# Patient Record
Sex: FEMALE | Race: WHITE | Employment: UNEMPLOYED | URBAN - METROPOLITAN AREA
[De-identification: names, ages, dates, MRNs, and addresses within clinical notes are randomized per-mention and may not be internally consistent; named-entity substitution may affect disease eponyms.]

---

## 2017-02-07 ENCOUNTER — OFFICE VISIT (OUTPATIENT)
Dept: FAMILY MEDICINE CLINIC | Age: 27
End: 2017-02-07

## 2017-02-07 VITALS
BODY MASS INDEX: 27.29 KG/M2 | HEIGHT: 63 IN | SYSTOLIC BLOOD PRESSURE: 137 MMHG | WEIGHT: 154 LBS | TEMPERATURE: 98.2 F | DIASTOLIC BLOOD PRESSURE: 89 MMHG | HEART RATE: 115 BPM | RESPIRATION RATE: 20 BRPM | OXYGEN SATURATION: 97 %

## 2017-02-07 DIAGNOSIS — R00.2 PALPITATIONS: Primary | ICD-10-CM

## 2017-02-07 NOTE — MR AVS SNAPSHOT
Visit Information Date & Time Provider Department Dept. Phone Encounter #  
 2/7/2017  3:00 PM Miguel Angel Schaeffer MD [address] [facility] [phone] [id number] Follow-up Instructions Return if symptoms worsen or fail to improve. Upcoming Health Maintenance Date Due  
 HPV AGE 9Y-34Y (1 of 3 - Female 3 Dose Series) 10/8/2002 Pneumococcal 19-64 Medium Risk (1 of 1 - PPSV23) 10/8/2010 DTaP/Tdap/Td series (1 - Tdap) 10/8/2012 INFLUENZA AGE 9 TO ADULT 8/1/2016 PAP AKA CERVICAL CYTOLOGY 9/8/2017 Allergies as of 2/7/2017  Review Complete On: 2/7/2017 By: Miguel Angel Schaeffer MD  
 No Known Allergies Current Immunizations  Reviewed on 8/17/2015 No immunizations on file. Not reviewed this visit Vitals BP Pulse Temp Resp Height(growth percentile) Weight(growth percentile) 137/89 (!) 115 98.2 °F (36.8 °C) 20 5' 3\" (1.6 m) 154 lb (69.9 kg) SpO2 BMI OB Status Smoking Status 97% 27.28 kg/m2 Having regular periods Current Every Day Smoker Vitals History BMI and BSA Data Body Mass Index Body Surface Area  
 27.28 kg/m 2 1.76 m 2 Preferred Pharmacy Pharmacy Name Phone [id number], [address] [facility] [phone] Your Updated Medication List  
  
   
This list is accurate as of: 2/7/17  4:13 PM.  Always use your most recent med list.  
  
  
  
  
 FLUoxetine 20 mg capsule Commonly known as:  PROzac LORazepam 0.5 mg tablet Commonly known as:  ATIVAN Take 1 Tab by mouth every eight (8) hours as needed for Anxiety. Max Daily Amount: 1.5 mg. Use sparingly and for extreme anxiety. Must last 30 days. norgestimate-ethinyl estradiol 0.25-35 mg-mcg Tab Commonly known as:  Allegra Bossier Take 1 Tab by mouth daily. Indications: DYSMENORRHEA  
  
 propranolol 10 mg tablet Commonly known as:  INDERAL Take 1 Tab by mouth three (3) times daily as needed. simvastatin 40 mg tablet Commonly known as:  ZOCOR Take 1 Tab by mouth nightly. SUMAtriptan 100 mg tablet Commonly known as:  IMITREX Take 1 Tab by mouth once as needed for Migraine for up to 1 dose. Repeat 2 hours if not gone, no more than 2 pills in 24 hours  
  
 varenicline 0.5 mg (11)- 1 mg (42) Dspk Commonly known as:  CHANTIX STARTER BARRETT Use as directed Follow-up Instructions Return if symptoms worsen or fail to improve. Introducing [facility] & HEALTH SERVICES! Dear Vasiliy Colon: 
Thank you for requesting a [employer]ular[employer] account. Our records indicate that you already have an active Singularu account. You can access your account anytime at [email or web address] Did you know that you can access your hospital and ER discharge instructions at any time in Singularu? You can also review all of your test results from your hospital stay or ER visit. Additional Information If you have questions, please visit the Frequently Asked Questions section of the Singularu website at [email or web address] Remember, Singularu is NOT to be used for urgent needs. For medical emergencies, dial 911. Now available from your iPhone and Android! Please provide this summary of care documentation to your next provider. Your primary care clinician is listed as WENCESLAO LAWSON. If you have any questions after today's visit, please call [phone].

## 2017-02-07 NOTE — PROGRESS NOTES
Patient here for headache, elevated bp, elevated heart rate . Not taking medications as ordered. \"afraid of them making me feel worse\"     1. Have you been to the ER, urgent care clinic since your last visit? Hospitalized since your last visit? No    2. Have you seen or consulted any other health care providers outside of the [address] [place] [address] since your last visit? Include any pap smears or colon screening. No         Chief Complaint   Patient presents with    Headache     bp elevated    Anxiety     \" feels like a panic attack that is not stopping, started about 3 weeks ago\"      she is a 23y.o. year old female who presents for evalution. Reviewed PmHx, RxHx, FmHx, SocHx, AllgHx and updated and dated in the chart. Patient Active Problem List    Diagnosis    Smoker    Upper abdominal pain    Migraine headache    ADD (attention deficit disorder)    Panic anxiety syndrome    Depression    Elevated LFTs    Seasonal allergic reaction    Hypercholesteremia       Review of Systems - negative except as listed above in the HPI    Objective:     Vitals:    02/07/17 1548   BP: 137/89   Pulse: (!) 115   Resp: 20   Temp: 98.2 °F (36.8 °C)   SpO2: 97%   Weight: 154 lb (69.9 kg)   Height: 5' 3\" (1.6 m)     Physical Examination: General appearance - alert, well appearing, and in no distress  Chest - clear to auscultation, no wheezes, rales or rhonchi, symmetric air entry  Heart - normal rate, regular rhythm, normal S1, S2, no murmurs, rubs, clicks or gallops    Assessment/ Plan:   Vasiliy Colon was seen today for headache and anxiety. Diagnoses and all orders for this visit:    Palpitations   -dwp to take rx given    Follow-up Disposition:  Return if symptoms worsen or fail to improve. I have discussed the diagnosis with the patient and the intended plan as seen in the above orders. The patient understands and agrees with the plan.  The patient has received an after-visit summary and questions were answered concerning future plans. Medication Side Effects and Warnings were discussed with patient  Patient Labs were reviewed and or requested:  Patient Past Records were reviewed and or requested    Apple Rivera M.D. There are no Patient Instructions on file for this visit.

## 2017-02-27 DIAGNOSIS — H69.81 EUSTACHIAN TUBE DYSFUNCTION, RIGHT: ICD-10-CM

## 2017-02-27 DIAGNOSIS — J30.89 ALLERGIC RHINITIS DUE TO OTHER ALLERGEN: ICD-10-CM

## 2017-02-27 RX ORDER — CETIRIZINE HCL 10 MG
10 TABLET ORAL DAILY
Qty: 30 TAB | Refills: 5 | Status: SHIPPED | OUTPATIENT
Start: 2017-02-27 | End: 2017-09-05 | Stop reason: SDUPTHER

## 2017-02-28 ENCOUNTER — TELEPHONE (OUTPATIENT)
Dept: FAMILY MEDICINE CLINIC | Age: 27
End: 2017-02-28

## 2017-02-28 ENCOUNTER — OFFICE VISIT (OUTPATIENT)
Dept: FAMILY MEDICINE CLINIC | Age: 27
End: 2017-02-28

## 2017-02-28 VITALS
HEART RATE: 73 BPM | DIASTOLIC BLOOD PRESSURE: 85 MMHG | TEMPERATURE: 97.9 F | WEIGHT: 155.4 LBS | SYSTOLIC BLOOD PRESSURE: 129 MMHG | OXYGEN SATURATION: 98 % | RESPIRATION RATE: 20 BRPM | HEIGHT: 63 IN | BODY MASS INDEX: 27.54 KG/M2

## 2017-02-28 DIAGNOSIS — R05.9 COUGH: ICD-10-CM

## 2017-02-28 DIAGNOSIS — J02.9 SORE THROAT: ICD-10-CM

## 2017-02-28 DIAGNOSIS — J06.9 ACUTE URI: Primary | ICD-10-CM

## 2017-02-28 DIAGNOSIS — B36.0 TINEA VERSICOLOR: ICD-10-CM

## 2017-02-28 DIAGNOSIS — H69.82 ETD (EUSTACHIAN TUBE DYSFUNCTION), LEFT: ICD-10-CM

## 2017-02-28 LAB
S PYO AG THROAT QL: NEGATIVE
VALID INTERNAL CONTROL?: YES

## 2017-02-28 RX ORDER — HYDROCODONE POLISTIREX AND CHLORPHENIRAMINE POLISTIREX 10; 8 MG/5ML; MG/5ML
5 SUSPENSION, EXTENDED RELEASE ORAL
Qty: 115 ML | Refills: 0 | Status: SHIPPED | OUTPATIENT
Start: 2017-02-28 | End: 2017-03-15 | Stop reason: ALTCHOICE

## 2017-02-28 RX ORDER — CODEINE PHOSPHATE AND GUAIFENESIN 10; 100 MG/5ML; MG/5ML
5 SOLUTION ORAL
Qty: 180 ML | Refills: 0 | Status: SHIPPED | OUTPATIENT
Start: 2017-02-28 | End: 2017-02-28

## 2017-02-28 RX ORDER — MOMETASONE FUROATE 50 UG/1
2 SPRAY, METERED NASAL DAILY
Qty: 1 CONTAINER | Refills: 2 | Status: ON HOLD | OUTPATIENT
Start: 2017-02-28 | End: 2018-10-15

## 2017-02-28 RX ORDER — AZITHROMYCIN 250 MG/1
TABLET, FILM COATED ORAL
Qty: 6 TAB | Refills: 0 | Status: SHIPPED | OUTPATIENT
Start: 2017-02-28 | End: 2017-03-05

## 2017-02-28 NOTE — PROGRESS NOTES
Chief Complaint   Patient presents with    Sore Throat    Cold Symptoms     productive cough, stuffy nose    Skin Problem     poss. vitiligo     Patient has not started Zyrtec yet. Pt reports having a productive cough with clear fleam.    Patient reports taking Theraflu for two days. Problem started \"yesterday\"  Reports Dizziness spells during postural changes and throughout the day  1. Have you been to the ER, urgent care clinic since your last visit? Hospitalized since your last visit? No    2. Have you seen or consulted any other health care providers outside of the [facility] since your last visit? Include any pap smears or colon screening.  No

## 2017-02-28 NOTE — TELEPHONE ENCOUNTER
Pt called. Med is sold out at every pharm for the cough syrup. She wants to  a new script for a different cough syrup. Pt is aware that old script needs to be returned to office when new script is ready.
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
## 2017-02-28 NOTE — MR AVS SNAPSHOT
Visit Information Date & Time Provider Department Dept. Phone Encounter #  
 2/28/2017 11:15 AM Johana Bamberger, NP [address] [phone] [id number] Follow-up Instructions Return if symptoms worsen or fail to improve. Upcoming Health Maintenance Date Due  
 HPV AGE 9Y-34Y (1 of 3 - Female 3 Dose Series) 10/8/2002 Pneumococcal 19-64 Medium Risk (1 of 1 - PPSV23) 10/8/2010 DTaP/Tdap/Td series (1 - Tdap) 10/8/2012 PAP AKA CERVICAL CYTOLOGY 9/8/2017 Allergies as of 2/28/2017  Review Complete On: 2/28/2017 By: Johana Bamberger, NP Severity Noted Reaction Type Reactions Tessalon [Benzonatate]  02/28/2017    Rash Current Immunizations  Reviewed on 8/17/2015 No immunizations on file. Not reviewed this visit You Were Diagnosed With   
  
 Codes Comments Acute URI    -  Primary ICD-10-CM: J06.9 ICD-9-CM: 465.9 Sore throat     ICD-10-CM: J02.9 ICD-9-CM: 615 Tinea versicolor     ICD-10-CM: B36.0 ICD-9-CM: 111.0 ETD (eustachian tube dysfunction), left     ICD-10-CM: U23.80 ICD-9-CM: 381.81 Cough     ICD-10-CM: R05 ICD-9-CM: 326. 2 Vitals BP  
  
  
  
  
  
 129/85 (BP 1 Location: Right arm, BP Patient Position: Sitting) Vitals History BMI and BSA Data Body Mass Index Body Surface Area  
 27.53 kg/m 2 1.77 m 2 Preferred Pharmacy Pharmacy Name Phone [id number], [address] [facility] [phone] Your Updated Medication List  
  
   
This list is accurate as of: 2/28/17 12:04 PM.  Always use your most recent med list.  
  
  
  
  
 azithromycin 250 mg tablet Commonly known as:  [place] Abu Take 2 tablets today, then take 1 tablet daily  
  
 cetirizine 10 mg tablet Commonly known as:  ZYRTEC Take 1 Tab by mouth daily. FLUoxetine 20 mg capsule Commonly known as:  PROzac  
as needed. guaiFENesin-codeine 100-10 mg/5 mL solution Commonly known as:  guaiFENesin AC Take 5 mL by mouth three (3) times daily as needed for Cough. Max Daily Amount: 15 mL. LORazepam 0.5 mg tablet Commonly known as:  ATIVAN Take 1 Tab by mouth every eight (8) hours as needed for Anxiety. Max Daily Amount: 1.5 mg. Use sparingly and for extreme anxiety. Must last 30 days. mometasone 50 mcg/actuation nasal spray Commonly known as:  NASONEX  
2 Sprays by Both Nostrils route daily. norgestimate-ethinyl estradiol 0.25-35 mg-mcg Tab Commonly known as:  Sana Boards Take 1 Tab by mouth daily. Indications: DYSMENORRHEA  
  
 propranolol 10 mg tablet Commonly known as:  INDERAL Take 1 Tab by mouth three (3) times daily as needed. simvastatin 40 mg tablet Commonly known as:  ZOCOR Take 1 Tab by mouth nightly. SUMAtriptan 100 mg tablet Commonly known as:  IMITREX Take 1 Tab by mouth once as needed for Migraine for up to 1 dose. Repeat 2 hours if not gone, no more than 2 pills in 24 hours Prescriptions Printed Refills  
 guaiFENesin-codeine (GUAIFENESIN AC) 100-10 mg/5 mL solution 0 Sig: Take 5 mL by mouth three (3) times daily as needed for Cough. Max Daily Amount: 15 mL. Class: Print Route: Oral  
  
Prescriptions Sent to Pharmacy Refills  
 mometasone (NASONEX) 50 mcg/actuation nasal spray 2 Si Sprays by Both Nostrils route daily. Class: Normal  
 Pharmacy: [facility] Ph #: [phone] Route: Both Nostrils  
 azithromycin (ZITHROMAX) 250 mg tablet 0 Sig: Take 2 tablets today, then take 1 tablet daily Class: Normal  
 Pharmacy: [facility] Ph #: [phone] We Performed the Following AMB POC RAPID STREP A [76518 CPT(R)] Follow-up Instructions Return if symptoms worsen or fail to improve. Patient Instructions Tinea Versicolor: Care Instructions Your Care Instructions Tinea versicolor is a skin infection caused by a yeast (fungus). It causes many small spots, usually on the chest and back. The spotted skin can be flaky or scaly. The spots do not tan in the sun, so they are lighter than the skin around them. Some spots may be darker than the skin around them. The yeast that causes tinea versicolor normally lives on your skin. But it becomes a problem only when warmth and humidity allow the yeast to grow rapidly and increase in number. Some people are more likely to get tinea versicolor. It does not spread from person to person. Tinea versicolor usually gets better as you age. You can treat tinea versicolor with cream or ointment that kills the yeast. You may need pills to kill the fungus if the spots cover a lot of your body. Although treatment kills the yeast quickly, your skin may not return to normal for months after treatment. You can get this condition again after treatment. Follow-up care is a key part of your treatment and safety. Be sure to make and go to all appointments, and call your doctor if you are having problems. It's also a good idea to know your test results and keep a list of the medicines you take. How can you care for yourself at home? · Follow the directions for use of creams, shampoos, or solutions. You will probably need to use them for 1 to 2 weeks. If your skin gets irritated, stop using the product, and call your doctor. · To prevent tinea versicolor, use a cream, shampoo, or solution one time a month. Your doctor may prescribe pills to prevent the spots from returning. · Dry off well after bathing. Keep your skin clean and dry. · Always wear sunscreen on exposed skin. Make sure to use a broad-spectrum sunscreen that has a sun protection factor (SPF) of 30 or higher. Use it every day, even when it is cloudy. · If you keep getting tinea versicolor, wash your clothes in very hot water to kill the yeast. 
When should you call for help? Call your doctor now or seek immediate medical care if: 
· Your skin is badly broken from scratching. · You have signs of infection such as: 
¨ Pain, warmth, or swelling in your skin. ¨ Red streaks near a wound in your skin. ¨ Pus coming from a wound in your skin. ¨ A fever. Watch closely for changes in your health, and be sure to contact your doctor if: 
· Your home treatment does not help. · Your skin is irritated or red. Where can you learn more? Go to http://emily-heather.info/. Enter K528 in the search box to learn more about \"Tinea Versicolor: Care Instructions. \" Current as of: March 23, 2016 Content Version: 11.1 © 5114-4300 [employer]. Care instructions adapted under license by [employer] (which disclaims liability or warranty for this information). If you have questions about a medical condition or this instruction, always ask your healthcare professional. [address] any warranty or liability for your use of this information. Introducing \[facility]\"" & HEALTH SERVICES! Dear Shagufta Wade: 
Thank you for requesting a [employer] account. Our records indicate that you already have an active [employer] account. You can access your account anytime at [email or web address] Did you know that you can access your hospital and ER discharge instructions at any time in [employer]? You can also review all of your test results from your hospital stay or ER visit. Additional Information If you have questions, please visit the Frequently Asked Questions section of the [employer] website at [email or web address] Remember, [employer] is NOT to be used for urgent needs. For medical emergencies, dial 911. Now available from your iPhone and Android! Please provide this summary of care documentation to your next provider. Your primary care clinician is listed as WENCESLAO LAWSON. If you have any questions after today's visit, please call [phone].

## 2017-02-28 NOTE — PATIENT INSTRUCTIONS
Tinea Versicolor: Care Instructions  Your Care Instructions  Tinea versicolor is a skin infection caused by a yeast (fungus). It causes many small spots, usually on the chest and back. The spotted skin can be flaky or scaly. The spots do not tan in the sun, so they are lighter than the skin around them. Some spots may be darker than the skin around them. The yeast that causes tinea versicolor normally lives on your skin. But it becomes a problem only when warmth and humidity allow the yeast to grow rapidly and increase in number. Some people are more likely to get tinea versicolor. It does not spread from person to person. Tinea versicolor usually gets better as you age. You can treat tinea versicolor with cream or ointment that kills the yeast. You may need pills to kill the fungus if the spots cover a lot of your body. Although treatment kills the yeast quickly, your skin may not return to normal for months after treatment. You can get this condition again after treatment. Follow-up care is a key part of your treatment and safety. Be sure to make and go to all appointments, and call your doctor if you are having problems. It's also a good idea to know your test results and keep a list of the medicines you take. How can you care for yourself at home? · Follow the directions for use of creams, shampoos, or solutions. You will probably need to use them for 1 to 2 weeks. If your skin gets irritated, stop using the product, and call your doctor. · To prevent tinea versicolor, use a cream, shampoo, or solution one time a month. Your doctor may prescribe pills to prevent the spots from returning. · Dry off well after bathing. Keep your skin clean and dry. · Always wear sunscreen on exposed skin. Make sure to use a broad-spectrum sunscreen that has a sun protection factor (SPF) of 30 or higher. Use it every day, even when it is cloudy.   · If you keep getting tinea versicolor, wash your clothes in very hot water to kill the yeast.  When should you call for help? Call your doctor now or seek immediate medical care if:  · Your skin is badly broken from scratching. · You have signs of infection such as:  ¨ Pain, warmth, or swelling in your skin. ¨ Red streaks near a wound in your skin. ¨ Pus coming from a wound in your skin. ¨ A fever. Watch closely for changes in your health, and be sure to contact your doctor if:  · Your home treatment does not help. · Your skin is irritated or red. Where can you learn more? Go to http://emily-heather.info/. Enter D714 in the search box to learn more about \"Tinea Versicolor: Care Instructions. \"  Current as of: March 23, 2016  Content Version: 11.1  © 0699-0128 [employer]. Care instructions adapted under license by [employer] (which disclaims liability or warranty for this information). If you have questions about a medical condition or this instruction, always ask your healthcare professional. [place] [address] any warranty or liability for your use of this information.

## 2017-03-15 ENCOUNTER — OFFICE VISIT (OUTPATIENT)
Dept: FAMILY MEDICINE CLINIC | Age: 27
End: 2017-03-15

## 2017-03-15 VITALS
HEIGHT: 63 IN | WEIGHT: 160 LBS | BODY MASS INDEX: 28.35 KG/M2 | HEART RATE: 73 BPM | DIASTOLIC BLOOD PRESSURE: 81 MMHG | TEMPERATURE: 97.9 F | RESPIRATION RATE: 20 BRPM | SYSTOLIC BLOOD PRESSURE: 131 MMHG | OXYGEN SATURATION: 98 %

## 2017-03-15 DIAGNOSIS — R42 VERTIGO: ICD-10-CM

## 2017-03-15 DIAGNOSIS — E78.00 HYPERCHOLESTEREMIA: Primary | ICD-10-CM

## 2017-03-15 RX ORDER — CETIRIZINE HCL 10 MG
10 TABLET ORAL DAILY
Qty: 30 TAB | Refills: 5 | Status: SHIPPED | OUTPATIENT
Start: 2017-03-15 | End: 2017-03-16 | Stop reason: SDUPTHER

## 2017-03-15 NOTE — PROGRESS NOTES
Patient here for follow up htn, fasting labs    1. Have you been to the ER, urgent care clinic since your last visit? Hospitalized since your last visit? No    2. Have you seen or consulted any other health care providers outside of the [address] [place] [address] since your last visit? Include any pap smears or colon screening. No     Chief Complaint   Patient presents with    Follow-up     htn    Labs     fasting     she is a 23y.o. year old female who presents for evalution. Reviewed PmHx, RxHx, FmHx, SocHx, AllgHx and updated and dated in the chart. Patient Active Problem List    Diagnosis    Smoker    Upper abdominal pain    Migraine headache    ADD (attention deficit disorder)    Panic anxiety syndrome    Depression    Elevated LFTs    Seasonal allergic reaction    Hypercholesteremia       Review of Systems - negative except as listed above in the HPI    Objective:     Vitals:    03/15/17 1015   BP: 131/81   Pulse: 73   Resp: 20   Temp: 97.9 °F (36.6 °C)   SpO2: 98%   Weight: 160 lb (72.6 kg)   Height: 5' 3\" (1.6 m)     Physical Examination: General appearance - alert, well appearing, and in no distress  Eyes - pupils equal and reactive, extraocular eye movements intact  Ears - bilateral TM's and external ear canals normal  Nose - normal and patent, no erythema, discharge or polyps  Mouth - mucous membranes moist, pharynx normal without lesions  Neck - supple, no significant adenopathy  Chest - clear to auscultation, no wheezes, rales or rhonchi, symmetric air entry  Heart - normal rate, regular rhythm, normal S1, S2, no murmurs, rubs, clicks or gallops    Assessment/ Plan:   Misael Diaz was seen today for follow-up and labs. Diagnoses and all orders for this visit:    Hypercholesteremia  -     LIPID PANEL  -     METABOLIC PANEL, COMPREHENSIVE  -cassidy rx    Vertigo  -     cetirizine (ZYRTEC) 10 mg tablet;  Take 1 Tab by mouth daily.  -add rx       Follow-up Disposition:  Return in about 6 months (around 9/15/2017). I have discussed the diagnosis with the patient and the intended plan as seen in the above orders. The patient understands and agrees with the plan. The patient has received an after-visit summary and questions were answered concerning future plans. Medication Side Effects and Warnings were discussed with patient  Patient Labs were reviewed and or requested:  Patient Past Records were reviewed and or requested    Aneesh Jett M.D. There are no Patient Instructions on file for this visit.

## 2017-03-16 ENCOUNTER — OFFICE VISIT (OUTPATIENT)
Dept: MIDWIFE SERVICES | Age: 27
End: 2017-03-16

## 2017-03-16 VITALS
HEIGHT: 63 IN | HEART RATE: 98 BPM | WEIGHT: 157 LBS | BODY MASS INDEX: 27.82 KG/M2 | SYSTOLIC BLOOD PRESSURE: 140 MMHG | DIASTOLIC BLOOD PRESSURE: 87 MMHG

## 2017-03-16 DIAGNOSIS — Z01.419 ENCOUNTER FOR WELL WOMAN EXAM WITH ROUTINE GYNECOLOGICAL EXAM: Primary | ICD-10-CM

## 2017-03-16 LAB
ALBUMIN SERPL-MCNC: 4.4 G/DL (ref 3.5–5.5)
ALBUMIN/GLOB SERPL: 1.5 {RATIO} (ref 1.2–2.2)
ALP SERPL-CCNC: 79 IU/L (ref 39–117)
ALT SERPL-CCNC: 7 IU/L (ref 0–32)
AST SERPL-CCNC: 8 IU/L (ref 0–40)
BILIRUB SERPL-MCNC: 0.3 MG/DL (ref 0–1.2)
BUN SERPL-MCNC: 10 MG/DL (ref 6–20)
BUN/CREAT SERPL: 14 (ref 8–20)
CALCIUM SERPL-MCNC: 9.4 MG/DL (ref 8.7–10.2)
CHLORIDE SERPL-SCNC: 101 MMOL/L (ref 96–106)
CHOLEST SERPL-MCNC: 139 MG/DL (ref 100–199)
CO2 SERPL-SCNC: 22 MMOL/L (ref 18–29)
CREAT SERPL-MCNC: 0.72 MG/DL (ref 0.57–1)
GLOBULIN SER CALC-MCNC: 3 G/DL (ref 1.5–4.5)
GLUCOSE SERPL-MCNC: 78 MG/DL (ref 65–99)
HDLC SERPL-MCNC: 32 MG/DL
INTERPRETATION, 910389: NORMAL
LDLC SERPL CALC-MCNC: 39 MG/DL (ref 0–99)
POTASSIUM SERPL-SCNC: 4.5 MMOL/L (ref 3.5–5.2)
PROT SERPL-MCNC: 7.4 G/DL (ref 6–8.5)
SODIUM SERPL-SCNC: 140 MMOL/L (ref 134–144)
TRIGL SERPL-MCNC: 341 MG/DL (ref 0–149)
VLDLC SERPL CALC-MCNC: 68 MG/DL (ref 5–40)

## 2017-03-16 NOTE — PROGRESS NOTES
Chief Complaint   Patient presents with    Well Woman     Visit Vitals    /87    Pulse 98    Ht 5' 3\" (1.6 m)    Wt 157 lb (71.2 kg)    Breastfeeding No    BMI 27.81 kg/m2     1. Have you been to the ER, urgent care clinic since your last visit? Hospitalized since your last visit? No    2. Have you seen or consulted any other health care providers outside of the [address] [place] [address] since your last visit? Include any pap smears or colon screening. No     Here today for well woman. She is due for a pap smear.

## 2017-03-16 NOTE — PROGRESS NOTES
Well Woman Check Up       Subjective:     23 y.o. CaucasianF   LMP: 2017 for routine annual exam    Social History: single partner, contraception - OCP (Oral Contraceptive Pills). Pertinent past medical history: . PAP History:     Neg pap  Preventive Measures. Colonoscopy:  Age 48 or earlier based on signigicant FH  Mammogram:  Age 48 or earlier based on FH  DEXA: Age 72 or sooner based on risk factors  Lipids:  Age 36 or based on FH/risk factors      Patient Active Problem List    Diagnosis Date Noted    Smoker 2015    Upper abdominal pain 2015    Migraine headache 10/03/2014    ADD (attention deficit disorder) 2010    Panic anxiety syndrome 2010    Depression 2010    Elevated LFTs 2010    Seasonal allergic reaction 2010    Hypercholesteremia 2010     Current Outpatient Prescriptions   Medication Sig Dispense Refill    mometasone (NASONEX) 50 mcg/actuation nasal spray 2 Sprays by Both Nostrils route daily. 1 Container 2    cetirizine (ZYRTEC) 10 mg tablet Take 1 Tab by mouth daily. 30 Tab 5    propranolol (INDERAL) 10 mg tablet Take 1 Tab by mouth three (3) times daily as needed. 60 Tab 0    SUMAtriptan (IMITREX) 100 mg tablet Take 1 Tab by mouth once as needed for Migraine for up to 1 dose. Repeat 2 hours if not gone, no more than 2 pills in 24 hours 12 Tab 5    LORazepam (ATIVAN) 0.5 mg tablet Take 1 Tab by mouth every eight (8) hours as needed for Anxiety. Max Daily Amount: 1.5 mg. Use sparingly and for extreme anxiety. Must last 30 days. 15 Tab 0    norgestimate-ethinyl estradiol (ORTHO-CYCLEN) 0.25-35 mg-mcg tab Take 1 Tab by mouth daily. Indications: DYSMENORRHEA 28 Tab 2    simvastatin (ZOCOR) 40 mg tablet Take 1 Tab by mouth nightly. 30 Tab 1    FLUoxetine (PROZAC) 20 mg capsule 20 mg daily. 5     Allergies   Allergen Reactions    Tessalon [Benzonatate] Rash     History reviewed.  No pertinent family history. Social History   Substance Use Topics    Smoking status: Current Every Day Smoker     Packs/day: 0.50     Years: 6.00     Types: Cigarettes    Smokeless tobacco: Never Used    Alcohol use No        ROS:  Feeling well. No dyspnea or chest pain on exertion. No abdominal pain, change in bowel habits, black or bloody stools. No urinary tract symptoms. GYN ROS: normal menses, no abnormal bleeding, pelvic pain or discharge, no breast pain or new or enlarging lumps on self exam. No neurological complaints. Objective:     Visit Vitals    /87    Pulse 98    Ht 5' 3\" (1.6 m)    Wt 157 lb (71.2 kg)    LMP 02/20/2017    Breastfeeding No    BMI 27.81 kg/m2     Gen: The patient appears well, alert, oriented x 3, in no distress. ENT:  normal.   Neck: supple. No adenopathy or thyromegaly. DOUGLAS. Lungs:  clear, good air entry, no wheezes, rhonchi or rales. CV: S1 and S2 normal, no murmurs, regular rate and rhythm. Abdomen: soft without tenderness, guarding, mass or organomegaly. Extremities:  no edema, normal peripheral pulses. Neurological:normal, no focal findings. BREAST EXAM: Examined upright and supine. Benign symmetrical breasts with everted nipples. No masses, no nodes. No nipple retraction or discharge. No skin retraction or subclavicular or axillary lymphadenopathy. PELVIC EXAM: VULVA: normal appearing vulva with no masses, tenderness or lesions, VAGINA: normal appearing vagina with normal color and discharge, no lesions, CERVIX: normal appearing cervix without discharge or lesions, UTERUS: uterus is normal size, shape, consistency and nontender, ADNEXA: normal adnexa in size, nontender and no masses, PAP: Pap smear done today, thin-prep method, HPV test exam chaperoned by:  Kelly Canales RN      Assessment/Plan:     Domenico Bran was seen today for well woman.     Diagnoses and all orders for this visit:    Encounter for well woman exam with routine gynecological exam      Follow-up Disposition:  Return in about 1 year (around 3/16/2018) for Annual exam..            Chava Lucas.  Checo Boles MD, [address] [place] [address], Retired    Caryle Cowman Professor, [address]

## 2017-03-17 ENCOUNTER — OFFICE VISIT (OUTPATIENT)
Dept: FAMILY MEDICINE CLINIC | Age: 27
End: 2017-03-17

## 2017-03-17 VITALS
BODY MASS INDEX: 27.96 KG/M2 | TEMPERATURE: 98.2 F | RESPIRATION RATE: 18 BRPM | OXYGEN SATURATION: 98 % | WEIGHT: 157.8 LBS | HEIGHT: 63 IN | HEART RATE: 96 BPM | DIASTOLIC BLOOD PRESSURE: 70 MMHG | SYSTOLIC BLOOD PRESSURE: 140 MMHG

## 2017-03-17 DIAGNOSIS — G44.219 EPISODIC TENSION-TYPE HEADACHE, NOT INTRACTABLE: Primary | ICD-10-CM

## 2017-03-17 RX ORDER — IBUPROFEN 600 MG/1
600 TABLET ORAL
Qty: 30 TAB | Refills: 0 | Status: SHIPPED | OUTPATIENT
Start: 2017-03-17 | End: 2017-08-08 | Stop reason: SDUPTHER

## 2017-03-17 NOTE — PROGRESS NOTES
Zeynep Nagel is a 23 y.o. female   Chief Complaint   Patient presents with    Head Pain    pt states she has had a HA since around 4am, also was recently seen by Dr Keena Pinon and was advised to start allergy meds which she states she took yesterday, advised to take daily. Pt states MATTHEWS is a 6/10 currently. Has not taken her imitrex afraid it might interact with the propranolol and advised they are fine together. she is a 23y.o. year old female who presents for evalution. Reviewed PmHx, RxHx, FmHx, SocHx, AllgHx and updated and dated in the chart. Review of Systems - negative except as listed above in the HPI    Objective:     Vitals:    03/17/17 1336   BP: 140/70   Pulse: 96   Resp: 18   Temp: 98.2 °F (36.8 °C)   TempSrc: Oral   SpO2: 98%   Weight: 157 lb 12.8 oz (71.6 kg)   Height: 5' 3\" (1.6 m)       Current Outpatient Prescriptions   Medication Sig    ibuprofen (MOTRIN) 600 mg tablet Take 1 Tab by mouth every eight (8) hours as needed for Pain.  propranolol (INDERAL) 10 mg tablet Take 1 Tab by mouth three (3) times daily as needed.  norgestimate-ethinyl estradiol (ORTHO-CYCLEN) 0.25-35 mg-mcg tab Take 1 Tab by mouth daily. Indications: DYSMENORRHEA    FLUoxetine (PROZAC) 20 mg capsule 20 mg daily.  mometasone (NASONEX) 50 mcg/actuation nasal spray 2 Sprays by Both Nostrils route daily.  cetirizine (ZYRTEC) 10 mg tablet Take 1 Tab by mouth daily.  SUMAtriptan (IMITREX) 100 mg tablet Take 1 Tab by mouth once as needed for Migraine for up to 1 dose. Repeat 2 hours if not gone, no more than 2 pills in 24 hours    LORazepam (ATIVAN) 0.5 mg tablet Take 1 Tab by mouth every eight (8) hours as needed for Anxiety. Max Daily Amount: 1.5 mg. Use sparingly and for extreme anxiety. Must last 30 days.  simvastatin (ZOCOR) 40 mg tablet Take 1 Tab by mouth nightly. No current facility-administered medications for this visit.         Physical Examination: General appearance - alert, well appearing, and in no distress  Mental status - alert, oriented to person, place, and time  Eyes - pupils equal and reactive, extraocular eye movements intact  Ears - air fluid levels b/l but without erythema  Chest - clear to auscultation, no wheezes, rales or rhonchi, symmetric air entry  Heart - normal rate, regular rhythm, normal S1, S2, no murmurs, rubs, clicks or gallops      Assessment/ Plan:   Lili Bautista was seen today for head pain. Diagnoses and all orders for this visit:    Episodic tension-type headache, not intractable  -     ibuprofen (MOTRIN) 600 mg tablet; Take 1 Tab by mouth every eight (8) hours as needed for Pain. Follow-up Disposition:  Return if symptoms worsen or fail to improve. I have discussed the diagnosis with the patient and the intended plan as seen in the above orders. The patient has received an after-visit summary and questions were answered concerning future plans. Pt conveyed understanding of plan.     Medication Side Effects and Warnings were discussed with patient      Niki Herring DO

## 2017-03-17 NOTE — PATIENT INSTRUCTIONS
Ibuprofen (By mouth)   Ibuprofen (eye-bue-PROE-fen)  Treats pain and fever. This medicine is an NSAID. Brand Name(s): Advil, Advil Children's, Advil Liqui-Gels, Advil Migraine, All-Purpose First Aid Kit, Children's Ibuprofen, Children's Motrin, Comfort Pac, Concentrated Motrin Infants' Drops, Genpril, Good Neighbor Cap-Profen, Good Neighbor Ibuprofen Infants', [facility] Children's Ibuprofen, [facility] Ibuprofen, [facility] Ibuprofen James Strength   There may be other brand names for this medicine. When This Medicine Should Not Be Used: This medicine is not right for everyone. Do not use if you had an allergic reaction (including asthma) to ibuprofen, aspirin, or another NSAID, or right before or after heart surgery. How to Use This Medicine:   Capsule, Liquid Filled Capsule, Suspension, Tablet, Chewable Tablet  · Your doctor will tell you how much medicine to use. Do not use more than directed. · Prescription ibuprofen should come with a Medication Guide. Ask your pharmacist for the Medication Guide if you do not have one. · Follow the instructions on the medicine label if you are using this medicine without a prescription. · Take this medicine with food or milk if it upsets your stomach. · Oral liquid: Shake well just before using. Measure with a marked measuring spoon, oral syringe, or medicine cup. · Chewable tablet: Chew completely before you swallow it. Then drink some water to make sure you swallow all of the medicine. · For Children: Ask your pharmacist if you are not sure how much medicine to give a child. The dose is usually based on weight, not age. Never give more medicine than directed. · For Adults: Do not take more than 6 pills in 1 day (24 hours) unless your doctor tells you to. · Missed dose: If you take this medicine on a regular basis and miss a dose, take it as soon as you can.  If it is almost time for your next dose, wait until then to use the medicine and skip the missed dose. Do not use extra medicine to make up for a missed dose. · Store the medicine in a closed container at room temperature, away from heat, moisture, and direct light. Do not freeze the oral liquid. Drugs and Foods to Avoid:   Ask your doctor or pharmacist before using any other medicine, including over-the-counter medicines, vitamins, and herbal products. · Some foods and medicine can affect how ibuprofen works. Tell your doctor if you are also using lithium, methotrexate, a blood thinner (such as warfarin), a steroid medicine (such as hydrocortisone, prednisolone, prednisone), a diuretic (water pill), or an ACE inhibitor blood pressure medicine. · Do not use any other NSAID medicine unless your doctor says it is okay. Some other NSAIDs are aspirin, diclofenac, naproxen, or celecoxib. · Do not drink alcohol while you are using this medicine. Warnings While Using This Medicine:   · Tell your doctor if you are pregnant or breastfeeding. Do not use this medicine during the later part of pregnancy. · Tell your doctor if you have kidney disease, liver disease, asthma, lupus or a similar connective tissue disease, or a history of ulcers or other digestion problems. Tell your doctor if you smoke or have heart or blood circulation problems, including high blood pressure, heart failure (CHF), or bleeding problems. · This medicine may cause the following problems:  ¨ Bleeding and ulcers in the stomach or intestines  ¨ Higher risk of heart attack or stroke  ¨ Liver damage  ¨ Kidney damage  ¨ Vision problems  · Call your doctor if symptoms get worse, pain lasts more than 10 days, or fever lasts more than 3 days. · This medicine might contain sugar or phenylalanine (aspartame). · Tell any doctor or dentist who treats you that you are using this medicine. · Keep all medicine out of the reach of children. Never share your medicine with anyone.   Possible Side Effects While Using This Medicine:   Call your doctor right away if you notice any of these side effects:  · Allergic reaction: Itching or hives, swelling in your face or hands, swelling or tingling in your mouth or throat, chest tightness, trouble breathing  · Blistering, peeling, or red skin rash  · Change in how much or how often you urinate  · Chest pain that may spread to your arms, jaw, back, or neck, trouble breathing, nausea, unusual sweating, faintness  · Chest pain, trouble breathing, weakness on one side of your body, severe headache, trouble seeing or talking, pain in your lower leg  · Dark urine or pale stools, nausea, vomiting, loss of appetite, stomach pain, yellow skin or eyes  · Fever, neck pain, stiff neck  · Severe stomach pain, vomiting blood, bloody or black, tarry stools  · Swelling in your hands, ankles, or feet, rapid weight gain  · Trouble seeing, blind spots, change in how you see colors  · Unusual bleeding, bruising, or weakness  If you notice these less serious side effects, talk with your doctor:   · Constipation, diarrhea, gas, mild upset stomach  · Dizziness, headache, ringing in the ears  If you notice other side effects that you think are caused by this medicine, tell your doctor. Call your doctor for medical advice about side effects. You may report side effects to FDA at 4-908-FDA-1867  © 2016 [address] is for End User's use only and may not be sold, redistributed or otherwise used for commercial purposes. The above information is an  only. It is not intended as medical advice for individual conditions or treatments. Talk to your doctor, nurse or pharmacist before following any medical regimen to see if it is safe and effective for you.

## 2017-03-17 NOTE — PROGRESS NOTES
Pt c/o headache since 4am, states she took tylenol this morning which helped, states starting to return  Was told by Jacinta Aschoff on 3/15 she had fluid in ears, has not taken any allergy meds  Also sates she has Imitrex at home, has not taken

## 2017-03-20 ENCOUNTER — TELEPHONE (OUTPATIENT)
Dept: FAMILY MEDICINE CLINIC | Age: 27
End: 2017-03-20

## 2017-03-20 RX ORDER — NORGESTIMATE AND ETHINYL ESTRADIOL 0.25-0.035
1 KIT ORAL DAILY
Qty: 3 PACKAGE | Refills: 3 | Status: SHIPPED | OUTPATIENT
Start: 2017-03-20 | End: 2017-09-05 | Stop reason: SDUPTHER

## 2017-03-20 NOTE — TELEPHONE ENCOUNTER
Okay to try the selsun blue medicated menthol. Apply at night to affected areas and sleep with it on. Enc use of emollient in the morning as needed for dry skin if needed.

## 2017-03-20 NOTE — TELEPHONE ENCOUNTER
Patient states that Andie Max NP told her to get selsom blue to help with her skin issue. Is selsom blue medicated menthol ok to use or will it dry her skin out too much. She can be reached @[phone]

## 2017-03-21 ENCOUNTER — TELEPHONE (OUTPATIENT)
Dept: MIDWIFE SERVICES | Age: 27
End: 2017-03-21

## 2017-03-24 LAB
CYTOLOGIST CVX/VAG CYTO: ABNORMAL
CYTOLOGY CVX/VAG DOC THIN PREP: ABNORMAL
DX ICD CODE: ABNORMAL
DX ICD CODE: ABNORMAL
HPV I/H RISK 1 DNA CVX QL PROBE+SIG AMP: POSITIVE
LABCORP, 190119: ABNORMAL
Lab: ABNORMAL
OTHER STN SPEC: ABNORMAL
PATH REPORT.FINAL DX SPEC: ABNORMAL
PATHOLOGIST CVX/VAG CYTO: ABNORMAL
RECOM F/U CVX/VAG CYTO: ABNORMAL
STAT OF ADQ CVX/VAG CYTO-IMP: ABNORMAL

## 2017-05-02 ENCOUNTER — TELEPHONE (OUTPATIENT)
Dept: MIDWIFE SERVICES | Age: 27
End: 2017-05-02

## 2017-05-02 NOTE — TELEPHONE ENCOUNTER
Returned phone call and had to leave a message. i encouraged her to use Lanyrdt as it will probably be faster getting back to her this afternoon since we are very busy with patients but if not i would call her tomorrow afternoon.

## 2017-05-02 NOTE — TELEPHONE ENCOUNTER
Pt calling stating she lost her birth control pills and has started her period and would like a refill of Spr[facility]. Please call.  (Pt aware that Dr. Sanjuanita Fontanez will be here this afternoon and that you have to discuss this with him first.)

## 2017-05-09 ENCOUNTER — OFFICE VISIT (OUTPATIENT)
Dept: MIDWIFE SERVICES | Age: 27
End: 2017-05-09

## 2017-05-09 VITALS
HEART RATE: 94 BPM | SYSTOLIC BLOOD PRESSURE: 134 MMHG | WEIGHT: 158 LBS | BODY MASS INDEX: 28 KG/M2 | HEIGHT: 63 IN | DIASTOLIC BLOOD PRESSURE: 91 MMHG

## 2017-05-09 DIAGNOSIS — R87.610 ATYPICAL SQUAMOUS CELL CHANGES OF UNDETERMINED SIGNIFICANCE (ASCUS) ON CERVICAL CYTOLOGY WITH POSITIVE HIGH RISK HUMAN PAPILLOMA VIRUS (HPV): Primary | ICD-10-CM

## 2017-05-09 DIAGNOSIS — R87.810 ATYPICAL SQUAMOUS CELL CHANGES OF UNDETERMINED SIGNIFICANCE (ASCUS) ON CERVICAL CYTOLOGY WITH POSITIVE HIGH RISK HUMAN PAPILLOMA VIRUS (HPV): Primary | ICD-10-CM

## 2017-05-09 NOTE — PROGRESS NOTES
Chief Complaint   Patient presents with    Colposcopy     Visit Vitals    BP (!) 134/91    Pulse 94    Ht 5' 3\" (1.6 m)    Wt 158 lb (71.7 kg)    Breastfeeding No    BMI 27.99 kg/m2       1. Have you been to the ER, urgent care clinic since your last visit? Hospitalized since your last visit? No    2. Have you seen or consulted any other health care providers outside of the [address] [place] [address] since your last visit? Include any pap smears or colon screening. No     Here today for colposcopy    [address] [facility]  OFFICE PROCEDURE PROGRESS NOTE        Chart reviewed for the following:   Justo Adamson RN, have reviewed the History, Physical and updated the Allergic reactions for Lisa Magdaleno.      TIME OUT performed immediately prior to start of procedure:   I, Vimal Foster RN, have performed the following reviews on [place] prior to the start of the procedure:            * Patient was identified by name and date of birth   * Agreement on procedure being performed was verified  * Risks and Benefits explained to the patient  * Procedure site verified and marked as necessary  * Patient was positioned for comfort  * Consent was signed and verified     Time: 8030      Date of procedure: 05/09/17    Procedure performed by: Michele Canales MD    Provider assisted by: Vimal Foster RN      Patient assisted by: self    How tolerated by patient: tolerated the procedure well with no complications    Comments:

## 2017-05-11 LAB
DX ICD CODE: NORMAL
PATH REPORT.FINAL DX SPEC: NORMAL
PATH REPORT.GROSS SPEC: NORMAL
PATH REPORT.RELEVANT HX SPEC: NORMAL
PATH REPORT.SITE OF ORIGIN SPEC: NORMAL
PATHOLOGIST NAME: NORMAL
PAYMENT PROCEDURE: NORMAL

## 2017-05-15 ENCOUNTER — TELEPHONE (OUTPATIENT)
Dept: MIDWIFE SERVICES | Age: 27
End: 2017-05-15

## 2017-05-23 NOTE — PROGRESS NOTES
Spoke with tiffanie on the phone regarding her results and got her scheduled for a follow up pap in nov 17th at 1100am.

## 2017-05-26 NOTE — PROGRESS NOTES
Colposcopy Procedure Note    May 26, 2017    Gomez Sen  23 y.o. WF   LMP:  2017      Preop DX:       ICD-10-CM ICD-9-CM    1. Atypical squamous cell changes of undetermined significance (ASCUS) on cervical cytology with positive high risk human papilloma virus (HPV) R87.610 795.01     R87.810 795.05         Post op Dx:  Abnormal staining at 7 o'clock    Procedure:   Colposcopy with biopsy of cervix and ECC    Indications:   Most recent Pap smear 6 weeks ago result: ASCUS with POSITIVE high risk HPV. The prior Pap result: Normal.  Prior cervical/vaginal disease: none. Prior cervical treatment: no treatment. Procedure Details   The risks and benefits of the procedure and written informed consent obtained. A timeout was taken to verify the patient and procedure to be performed. Speculum placed in vagina and excellent visualization of cervix achieved. Cervix examined under high power with complete visualization of the transformation zone. The cervix was cleaned with NS and examined under a green filter. Acetic acid was applied and acetowhite lesions were identified at 7 o'clock and an ECC was performed with good tissue collection       Findings:  Cervix:  cervix swabbed with Lugol's solution and SCJ visualized - lesion at 7 o'clock.  ;    Vaginal inspection: normal without visible lesions. Vulvar colposcopy: normal mucosa without lesions. Specimens: ECC and cervical biopsy    Complications: none. Plan:  Dash Maher was seen today for colposcopy. Diagnoses and all orders for this visit:    Atypical squamous cell changes of undetermined significance (ASCUS) on cervical cytology with positive high risk human papilloma virus (HPV)    Other orders  -     SURGICAL PATHOLOGY      Follow-up Disposition:  Return in about 6 months (around 2017) for Repeat pap pending pathology. Gustavo Reed MD, [address] [place] [address], Retired    Steffi Charles Professor, OB/GYN [employer]

## 2017-06-19 DIAGNOSIS — F41.9 ANXIETY: ICD-10-CM

## 2017-06-19 DIAGNOSIS — R00.0 TACHYCARDIA: ICD-10-CM

## 2017-06-19 RX ORDER — PROPRANOLOL HYDROCHLORIDE 10 MG/1
TABLET ORAL
Qty: 60 TAB | Refills: 2 | Status: SHIPPED | OUTPATIENT
Start: 2017-06-19 | End: 2017-09-05 | Stop reason: SDUPTHER

## 2017-07-26 RX ORDER — SIMVASTATIN 40 MG/1
TABLET, FILM COATED ORAL
Qty: 30 TAB | Refills: 2 | Status: SHIPPED | OUTPATIENT
Start: 2017-07-26 | End: 2017-09-05 | Stop reason: SDUPTHER

## 2017-08-08 DIAGNOSIS — G44.219 EPISODIC TENSION-TYPE HEADACHE, NOT INTRACTABLE: ICD-10-CM

## 2017-08-08 RX ORDER — IBUPROFEN 600 MG/1
TABLET ORAL
Qty: 30 TAB | Refills: 0 | Status: SHIPPED | OUTPATIENT
Start: 2017-08-08 | End: 2017-09-05 | Stop reason: SDUPTHER

## 2017-09-05 DIAGNOSIS — F41.9 ANXIETY: ICD-10-CM

## 2017-09-05 DIAGNOSIS — G44.219 EPISODIC TENSION-TYPE HEADACHE, NOT INTRACTABLE: ICD-10-CM

## 2017-09-05 DIAGNOSIS — R00.0 TACHYCARDIA: ICD-10-CM

## 2017-09-05 DIAGNOSIS — J30.89 ALLERGIC RHINITIS DUE TO OTHER ALLERGEN: ICD-10-CM

## 2017-09-05 DIAGNOSIS — H69.81 EUSTACHIAN TUBE DYSFUNCTION, RIGHT: ICD-10-CM

## 2017-09-05 RX ORDER — SIMVASTATIN 40 MG/1
TABLET, FILM COATED ORAL
Qty: 30 TAB | Refills: 2 | Status: SHIPPED | OUTPATIENT
Start: 2017-09-05 | End: 2017-11-24

## 2017-09-05 RX ORDER — PROPRANOLOL HYDROCHLORIDE 10 MG/1
10 TABLET ORAL
Qty: 60 TAB | Refills: 2 | Status: SHIPPED | OUTPATIENT
Start: 2017-09-05 | End: 2018-04-17 | Stop reason: SDUPTHER

## 2017-09-05 RX ORDER — LORAZEPAM 0.5 MG/1
0.5 TABLET ORAL
Qty: 15 TAB | Refills: 0 | OUTPATIENT
Start: 2017-09-05

## 2017-09-05 RX ORDER — CETIRIZINE HCL 10 MG
10 TABLET ORAL DAILY
Qty: 30 TAB | Refills: 5 | Status: SHIPPED | OUTPATIENT
Start: 2017-09-05 | End: 2017-11-22

## 2017-09-05 RX ORDER — IBUPROFEN 600 MG/1
600 TABLET ORAL
Qty: 30 TAB | Refills: 1 | Status: SHIPPED | OUTPATIENT
Start: 2017-09-05 | End: 2018-02-15 | Stop reason: SDUPTHER

## 2017-09-05 NOTE — TELEPHONE ENCOUNTER
From: Sujit Enriquez  To: Sahil Cardoza DO  Sent: 9/5/2017 11:13 AM EDT  Subject: Medication Renewal Request    Original authorizing provider: DO Isaiah Alas would like a refill of the following medications:  ibuprofen (MOTRIN) 600 mg tablet Sahil Cardoza DO]    Preferred pharmacy: [facility]/PHARMACY #[id number] [address]    Comment:      Medication renewals requested in this message routed to other providers:  LORazepam (ATIVAN) 0.5 mg tablet Bandar Mcallister, NP]  propranolol (INDERAL) 10 mg tablet Bandar Mcallister NP]  simvastatin (ZOCOR) 40 mg tablet Bandar Mcallister NP]  cetirizine (ZYRTEC) 10 mg tablet Piero Degroot MD]  norgestimate-ethinyl estradiol (Leah Loser) 0.25-35 mg-mcg tab Angeli Bethea MD]

## 2017-09-05 NOTE — TELEPHONE ENCOUNTER
From: Liz Pacheco  To: West Dubois MD  Sent: 9/5/2017 11:13 AM EDT  Subject: Medication Renewal Request    Original authorizing provider: MD Demetra Lang would like a refill of the following medications:  cetirizine (ZYRTEC) 10 mg tablet West Dubois MD]    [id number]    Comment:      Medication renewals requested in this message routed to other providers:  ibuprofen (MOTRIN) 600 mg tablet [Violet Sim, DO]  LORazepam (ATIVAN) 0.5 mg tablet Jeri Hammond NP]  propranolol (INDERAL) 10 mg tablet Jeri Hammond NP]  simvastatin (ZOCOR) 40 mg tablet Jeri Hammond NP]  norgestimate-ethinyl estradiol (ORTHO-CYCLEN, SPRINTEC) 0.25-35 mg-mcg tab Keke Valenzuela MD]

## 2017-09-05 NOTE — TELEPHONE ENCOUNTER
From: Papo Villalobos  To: Jennifer Russell NP  Sent: 9/5/2017 11:13 AM EDT  Subject: Medication Renewal Request    Original authorizing provider: SHERRY Ferrer would like a refill of the following medications:  LORazepam (ATIVAN) 0.5 mg tablet Jennifer Russell NP]  propranolol (INDERAL) 10 mg tablet Jennifer Russell NP]  simvastatin (ZOCOR) 40 mg tablet Jennifer Russell NP]    Preferred pharmacy: [facility]/PHARMACY #[id number] [address]    Comment:      Medication renewals requested in this message routed to other providers:  ibuprofen (MOTRIN) 600 mg tablet [Violet Sim, ]  cetirizine (ZYRTEC) 10 mg tablet Jarrett Johnson MD]  norgestimate-ethinyl estradiol (Carmita Guadalajara) 0.25-35 mg-mcg tab Idris Gupta MD]

## 2017-11-22 ENCOUNTER — OFFICE VISIT (OUTPATIENT)
Dept: FAMILY MEDICINE CLINIC | Age: 27
End: 2017-11-22

## 2017-11-22 VITALS
TEMPERATURE: 98.6 F | WEIGHT: 157 LBS | BODY MASS INDEX: 27.82 KG/M2 | SYSTOLIC BLOOD PRESSURE: 151 MMHG | RESPIRATION RATE: 18 BRPM | DIASTOLIC BLOOD PRESSURE: 90 MMHG | HEART RATE: 70 BPM | OXYGEN SATURATION: 98 % | HEIGHT: 63 IN

## 2017-11-22 DIAGNOSIS — E78.00 HYPERCHOLESTEREMIA: Primary | ICD-10-CM

## 2017-11-22 DIAGNOSIS — R79.89 ELEVATED LFTS: ICD-10-CM

## 2017-11-22 DIAGNOSIS — Z91.09 POLLEN ALLERGIES: ICD-10-CM

## 2017-11-22 RX ORDER — LORATADINE 10 MG/1
10 TABLET ORAL DAILY
Qty: 30 TAB | Refills: 11 | Status: ON HOLD | OUTPATIENT
Start: 2017-11-22 | End: 2018-10-15

## 2017-11-22 NOTE — PROGRESS NOTES
1. Have you been to the ER, urgent care clinic since your last visit? Hospitalized since your last visit? No    2. Have you seen or consulted any other health care providers outside of the [address] [place] [address] since your last visit? Include any pap smears or colon screening. No     Chief Complaint   Patient presents with   [address] Only     Patient presents to the office for follow-up, labs. Chief Complaint   Patient presents with   [address] Only     She is a 33 y.o. female who presents for evalution. Reviewed PmHx, RxHx, FmHx, SocHx, AllgHx and updated and dated in the chart. Patient Active Problem List    Diagnosis    Smoker    Upper abdominal pain    Migraine headache    ADD (attention deficit disorder)    Panic anxiety syndrome    Depression    Elevated LFTs    Seasonal allergic reaction    Hypercholesteremia       Review of Systems - negative except as listed above in the HPI    Objective:     Vitals:    11/22/17 1023   BP: 151/90   Pulse: 70   Resp: 18   Temp: 98.6 °F (37 °C)   TempSrc: Oral   SpO2: 98%   Weight: 157 lb (71.2 kg)   Height: 5' 3\" (1.6 m)     Physical Examination: General appearance - alert, well appearing, and in no distress  Chest - clear to auscultation, no wheezes, rales or rhonchi, symmetric air entry  Heart - normal rate, regular rhythm, normal S1, S2, no murmurs, rubs, clicks or gallops    Assessment/ Plan:   Diagnoses and all orders for this visit:    1. Hypercholesteremia  -     LIPID PANEL  -     METABOLIC PANEL, COMPREHENSIVE    2. Elevated LFTs  -     LIPID PANEL  -     METABOLIC PANEL, COMPREHENSIVE    3. Pollen allergies  -     loratadine (CLARITIN) 10 mg tablet; Take 1 Tab by mouth daily for 360 days. Follow-up Disposition:  Return if symptoms worsen or fail to improve. I have discussed the diagnosis with the patient and the intended plan as seen in the above orders. The patient understands and agrees with the plan. The patient has received an after-visit summary and questions were answered concerning future plans. Medication Side Effects and Warnings were discussed with patient  Patient Labs were reviewed and or requested:  Patient Past Records were reviewed and or requested    Scarlet Romero M.D. There are no Patient Instructions on file for this visit.

## 2017-11-22 NOTE — MR AVS SNAPSHOT
Visit Information Date & Time Provider Department Dept. Phone Encounter #  
 11/22/2017 10:00 AM Nati Rider MD [address] [facility] [phone] [id number] Follow-up Instructions Return if symptoms worsen or fail to improve. Upcoming Health Maintenance Date Due  
 HPV AGE 9Y-34Y (1 of 3 - Female 3 Dose Series) 10/8/2002 Pneumococcal 19-64 Medium Risk (1 of 1 - PPSV23) 10/8/2010 DTaP/Tdap/Td series (1 - Tdap) 10/8/2012 PAP AKA CERVICAL CYTOLOGY 3/16/2020 Allergies as of 11/22/2017  Review Complete On: 11/22/2017 By: Nati Rider MD  
  
 Severity Noted Reaction Type Reactions Tessalon [Benzonatate]  02/28/2017    Rash Current Immunizations  Reviewed on 8/17/2015 No immunizations on file. Not reviewed this visit You Were Diagnosed With   
  
 Codes Comments Hypercholesteremia    -  Primary ICD-10-CM: E78.00 ICD-9-CM: 272.0 Elevated LFTs     ICD-10-CM: R79.89 ICD-9-CM: 790.6 Pollen allergies     ICD-10-CM: J30.1 ICD-9-CM: 477.0 Vitals BP Pulse Temp Resp Height(growth percentile) Weight(growth percentile) 151/90 70 98.6 °F (37 °C) (Oral) 18 5' 3\" (1.6 m) 157 lb (71.2 kg) SpO2 BMI OB Status Smoking Status 98% 27.81 kg/m2 Having regular periods Current Every Day Smoker BMI and BSA Data Body Mass Index Body Surface Area  
 27.81 kg/m 2 1.78 m 2 Preferred Pharmacy Pharmacy Name Phone [id number], [address] [facility] [phone] Your Updated Medication List  
  
   
This list is accurate as of: 11/22/17 10:38 AM.  Always use your most recent med list.  
  
  
  
  
 albuterol 2.5 mg /3 mL (0.083 %) nebulizer solution Commonly known as:  PROVENTIL VENTOLIN  
USE 1 AMPULE VIA NEBULIZER EVERY4 HRS AS NEEDED FOR WHEEZING  
  
 FLUoxetine 20 mg capsule Commonly known as:  PROzac  
20 mg daily. ibuprofen 600 mg tablet Commonly known as:  MOTRIN Take 1 Tab by mouth every eight (8) hours as needed for Pain.  
  
 loratadine 10 mg tablet Commonly known as:  Rosan [place] Take 1 Tab by mouth daily for 360 days. LORazepam 0.5 mg tablet Commonly known as:  ATIVAN Take 1 Tab by mouth every eight (8) hours as needed for Anxiety. Max Daily Amount: 1.5 mg. Use sparingly and for extreme anxiety. Must last 30 days. mometasone 50 mcg/actuation nasal spray Commonly known as:  NASONEX  
2 Sprays by Both Nostrils route daily. norgestimate-ethinyl estradiol 0.25-35 mg-mcg Tab Commonly known as:  Farhat Betty Take 1 Tab by mouth daily. Indications: DYSMENORRHEA  
  
 propranolol 10 mg tablet Commonly known as:  INDERAL Take 1 Tab by mouth three (3) times daily as needed. simvastatin 40 mg tablet Commonly known as:  ZOCOR  
TAKE 1 TABLET BY MOUTH NIGHTLY. SUMAtriptan 100 mg tablet Commonly known as:  IMITREX Take 1 Tab by mouth once as needed for Migraine for up to 1 dose. Repeat 2 hours if not gone, no more than 2 pills in 24 hours Prescriptions Sent to Pharmacy Refills  
 loratadine (CLARITIN) 10 mg tablet 11 Sig: Take 1 Tab by mouth daily for 360 days. Class: Normal  
 Pharmacy: [address], [address] Ph #: [phone] Route: Oral  
  
We Performed the Following LIPID PANEL [99936 CPT(R)] METABOLIC PANEL, COMPREHENSIVE [48373 CPT(R)] Follow-up Instructions Return if symptoms worsen or fail to improve. Introducing [facility]! Dear Adina Nayak: 
Thank you for requesting a [email or web address] account. Our records indicate that you already have an active [email or web address] account. You can access your account anytime at [email or web address] Did you know that you can access your hospital and ER discharge instructions at any time in [email or web address]?   You can also review all of your test results from your hospital stay or ER visit. Additional Information If you have questions, please visit the Frequently Asked Questions section of the Sonian website at [email or web address] Remember, Sonian is NOT to be used for urgent needs. For medical emergencies, dial 911. Now available from your iPhone and Android! Please provide this summary of care documentation to your next provider. Your primary care clinician is listed as WENCESLAO LAWSON. If you have any questions after today's visit, please call [phone].

## 2017-11-23 LAB
ALBUMIN SERPL-MCNC: 4.4 G/DL (ref 3.5–5.5)
ALBUMIN/GLOB SERPL: 1.5 {RATIO} (ref 1.2–2.2)
ALP SERPL-CCNC: 84 IU/L (ref 39–117)
ALT SERPL-CCNC: 18 IU/L (ref 0–32)
AST SERPL-CCNC: 18 IU/L (ref 0–40)
BILIRUB SERPL-MCNC: 0.3 MG/DL (ref 0–1.2)
BUN SERPL-MCNC: 11 MG/DL (ref 6–20)
BUN/CREAT SERPL: 14 (ref 9–23)
CALCIUM SERPL-MCNC: 9.7 MG/DL (ref 8.7–10.2)
CHLORIDE SERPL-SCNC: 102 MMOL/L (ref 96–106)
CHOLEST SERPL-MCNC: 160 MG/DL (ref 100–199)
CO2 SERPL-SCNC: 23 MMOL/L (ref 18–29)
CREAT SERPL-MCNC: 0.8 MG/DL (ref 0.57–1)
GFR SERPLBLD CREATININE-BSD FMLA CKD-EPI: 102 ML/MIN/1.73
GFR SERPLBLD CREATININE-BSD FMLA CKD-EPI: 118 ML/MIN/1.73
GLOBULIN SER CALC-MCNC: 2.9 G/DL (ref 1.5–4.5)
GLUCOSE SERPL-MCNC: 82 MG/DL (ref 65–99)
HDLC SERPL-MCNC: 32 MG/DL
INTERPRETATION, 910389: NORMAL
LDLC SERPL CALC-MCNC: ABNORMAL MG/DL (ref 0–99)
PDF IMAGE, 910387: NORMAL
POTASSIUM SERPL-SCNC: 4.9 MMOL/L (ref 3.5–5.2)
PROT SERPL-MCNC: 7.3 G/DL (ref 6–8.5)
SODIUM SERPL-SCNC: 142 MMOL/L (ref 134–144)
TRIGL SERPL-MCNC: 456 MG/DL (ref 0–149)
VLDLC SERPL CALC-MCNC: ABNORMAL MG/DL (ref 5–40)

## 2017-11-24 RX ORDER — ATORVASTATIN CALCIUM 40 MG/1
40 TABLET, FILM COATED ORAL DAILY
Qty: 30 TAB | Refills: 1 | Status: SHIPPED | OUTPATIENT
Start: 2017-11-24 | End: 2018-02-15 | Stop reason: SDUPTHER

## 2017-12-04 ENCOUNTER — DOCUMENTATION ONLY (OUTPATIENT)
Dept: FAMILY MEDICINE CLINIC | Age: 27
End: 2017-12-04

## 2017-12-18 RX ORDER — NORGESTIMATE AND ETHINYL ESTRADIOL 0.25-0.035
KIT ORAL
Qty: 3 DOSE PACK | Refills: 0 | Status: ON HOLD | OUTPATIENT
Start: 2017-12-18 | End: 2018-10-15

## 2017-12-18 NOTE — TELEPHONE ENCOUNTER
Pt requesting a refill of Sprintec be sent to [facility] on file. Stated she has not been able to follow up due to no transportation. Please call if any questions.

## 2018-02-15 DIAGNOSIS — G44.219 EPISODIC TENSION-TYPE HEADACHE, NOT INTRACTABLE: ICD-10-CM

## 2018-02-15 RX ORDER — IBUPROFEN 600 MG/1
TABLET ORAL
Qty: 30 TAB | Refills: 1 | Status: SHIPPED | OUTPATIENT
Start: 2018-02-15 | End: 2018-04-20 | Stop reason: SDUPTHER

## 2018-02-15 RX ORDER — ATORVASTATIN CALCIUM 40 MG/1
TABLET, FILM COATED ORAL
Qty: 30 TAB | Refills: 1 | Status: SHIPPED | OUTPATIENT
Start: 2018-02-15 | End: 2018-04-17 | Stop reason: SDUPTHER

## 2018-04-17 DIAGNOSIS — F41.9 ANXIETY: ICD-10-CM

## 2018-04-17 DIAGNOSIS — G43.909 MIGRAINE WITHOUT STATUS MIGRAINOSUS, NOT INTRACTABLE, UNSPECIFIED MIGRAINE TYPE: ICD-10-CM

## 2018-04-17 DIAGNOSIS — R00.0 TACHYCARDIA: ICD-10-CM

## 2018-04-17 RX ORDER — ATORVASTATIN CALCIUM 40 MG/1
40 TABLET, FILM COATED ORAL DAILY
Qty: 90 TAB | Refills: 1 | Status: SHIPPED | OUTPATIENT
Start: 2018-04-17 | End: 2018-07-01 | Stop reason: SDUPTHER

## 2018-04-17 NOTE — TELEPHONE ENCOUNTER
From: Johnny Speaks  To: Isabel Avery MD  Sent: 4/17/2018 9:47 AM EDT  Subject: Medication Renewal Request    Original authorizing provider: Tomasa Citrin, MD Fredderick Goodpasture  Danni Bellamy would like a refill of the following medications:  atorvastatin (LIPITOR) 40 mg tablet Isabel Avery MD]    Preferred pharmacy: [facility]/PHARMACY [address]    Comment:      Medication renewals requested in this message routed to other providers:  SUMAtriptan (IMITREX) 100 mg tablet Jennifer Weathers NP]  LORazepam (ATIVAN) 0.5 mg tablet Jennifer Weathers NP]  propranolol (INDERAL) 10 mg tablet Jennifer Weathers NP]

## 2018-04-18 RX ORDER — SUMATRIPTAN 100 MG/1
100 TABLET, FILM COATED ORAL
Qty: 12 TAB | Refills: 5 | Status: ON HOLD | OUTPATIENT
Start: 2018-04-18 | End: 2018-10-15

## 2018-04-18 RX ORDER — PROPRANOLOL HYDROCHLORIDE 10 MG/1
10 TABLET ORAL
Qty: 60 TAB | Refills: 2 | Status: ON HOLD | OUTPATIENT
Start: 2018-04-18 | End: 2018-10-15

## 2018-04-18 RX ORDER — LORAZEPAM 0.5 MG/1
0.5 TABLET ORAL
Qty: 15 TAB | Refills: 0 | OUTPATIENT
Start: 2018-04-18

## 2018-04-18 NOTE — TELEPHONE ENCOUNTER
From: Sebastián Rosen  To: Jazz Headley NP  Sent: 4/17/2018 9:47 AM EDT  Subject: Medication Renewal Request    Original authorizing provider: SHERRY Prater would like a refill of the following medications:  SUMAtriptan (IMITREX) 100 mg tablet Jazz Headley NP]  LORazepam (ATIVAN) 0.5 mg tablet Jazz Headley NP]  propranolol (INDERAL) 10 mg tablet Jazz Headley NP]    Preferred pharmacy: [place] [address]    Comment:      Medication renewals requested in this message routed to other providers:  atorvastatin (LIPITOR) 40 mg tablet Alie Long MD]

## 2018-04-20 DIAGNOSIS — G44.219 EPISODIC TENSION-TYPE HEADACHE, NOT INTRACTABLE: ICD-10-CM

## 2018-04-20 RX ORDER — IBUPROFEN 600 MG/1
600 TABLET ORAL
Qty: 30 TAB | Refills: 1 | Status: SHIPPED | OUTPATIENT
Start: 2018-04-20 | End: 2018-05-25 | Stop reason: SDUPTHER

## 2018-04-20 NOTE — TELEPHONE ENCOUNTER
From: Hudson Bettencourt  To: Candice Lam DO  Sent: 4/20/2018 12:19 PM EDT  Subject: Medication Renewal Request    Original authorizing provider: DO Verona Mireles would like a refill of the following medications:  ibuprofen (MOTRIN) 600 mg tablet Candice Lam DO]    Preferred pharmacy: [address] [place] [address] Street:

## 2018-05-25 DIAGNOSIS — G44.219 EPISODIC TENSION-TYPE HEADACHE, NOT INTRACTABLE: ICD-10-CM

## 2018-05-25 RX ORDER — IBUPROFEN 600 MG/1
600 TABLET ORAL
Qty: 30 TAB | Refills: 1 | Status: SHIPPED | OUTPATIENT
Start: 2018-05-25 | End: 2018-07-18 | Stop reason: SDUPTHER

## 2018-05-25 NOTE — TELEPHONE ENCOUNTER
From: Natali Hdez  To: Anurag Abebe DO  Sent: 5/25/2018 2:59 PM EDT  Subject: Medication Renewal Request    Original authorizing provider: DO Alejandra Feliciano would like a refill of the following medications:  ibuprofen (MOTRIN) 600 mg tablet Anurag Abebe DO]    Preferred pharmacy: [address] [place] [address]:      Medication renewals requested in this message routed to other providers:  [address] [facility], 29, 0.25-35 mg-mcg tab Essence Davalos MD]

## 2018-05-29 RX ORDER — NORGESTIMATE AND ETHINYL ESTRADIOL 0.25-0.035
KIT ORAL
Qty: 3 DOSE PACK | Refills: 0 | OUTPATIENT
Start: 2018-05-29

## 2018-05-29 NOTE — TELEPHONE ENCOUNTER
From: Casey Echeverria  Sent: 5/25/2018 2:59 PM EDT  Subject: Medication Renewal Request    Original authorizing provider: MD Ludwig Barrera Quan would like a refill of the following medications:  Milus Stephanie, 28, 0.25-35 mg-mcg tab Nayana Lopez MD]    Preferred pharmacy: [place] [address]    Comment:      Medication renewals requested in this message routed to other providers:  ibuprofen (MOTRIN) 600 mg tablet [Violet Sim, DO]

## 2018-07-02 RX ORDER — ATORVASTATIN CALCIUM 40 MG/1
40 TABLET, FILM COATED ORAL DAILY
Qty: 90 TAB | Refills: 1 | Status: SHIPPED | OUTPATIENT
Start: 2018-07-02 | End: 2018-07-18 | Stop reason: SDUPTHER

## 2018-07-02 NOTE — TELEPHONE ENCOUNTER
From: Shanel Foley  To: Haydee Becker MD  Sent: 7/1/2018 11:30 AM EDT  Subject: Medication Renewal Request    Original authorizing provider: Haydee Becker MD    [facility] would [facility] refill of the following medications:  atorvastatin (LIPITOR) 40 mg tablet Haydee Becker MD]    Preferred pharmacy: [place] [address]    Comment:      Medication renewals requested in this message routed to other providers:  ibuprofen (MOTRIN) 600 mg tablet [Violet Sim, DO]

## 2018-07-13 ENCOUNTER — TELEPHONE (OUTPATIENT)
Dept: OBGYN CLINIC | Age: 28
End: 2018-07-13

## 2018-07-13 NOTE — TELEPHONE ENCOUNTER
This nurse left a detailed message for the patient regarding MD recommendations and need to be seen prior to getting a prescription refilled.

## 2018-07-13 NOTE — TELEPHONE ENCOUNTER
Message left at 10:03am      33year old patient last seen in office for AE on 3/16/18 and 5/9/17 for procedure. Patient cancelled her appointment on 3/30/18 and no showed on 4/6/18. Patient left a message requesting a refill on her birthcontrol to get it back in her system. Patient has appointment for 7/23/18 to see Dr. Rosey Gan. Patient last prescribed sprintec on 12/18/17 for 3 packages        ? Should this nurse refill the birth control for one month or advised patient she needs to wait till she is seen.       ? upt       Please advise

## 2018-07-18 DIAGNOSIS — G44.219 EPISODIC TENSION-TYPE HEADACHE, NOT INTRACTABLE: ICD-10-CM

## 2018-07-19 RX ORDER — ATORVASTATIN CALCIUM 40 MG/1
40 TABLET, FILM COATED ORAL DAILY
Qty: 90 TAB | Refills: 1 | Status: SHIPPED | OUTPATIENT
Start: 2018-07-19 | End: 2019-03-13 | Stop reason: SDUPTHER

## 2018-07-19 RX ORDER — IBUPROFEN 600 MG/1
600 TABLET ORAL
Qty: 30 TAB | Refills: 1 | Status: SHIPPED | OUTPATIENT
Start: 2018-07-19 | End: 2018-11-13 | Stop reason: SDUPTHER

## 2018-07-19 NOTE — TELEPHONE ENCOUNTER
From: Kelton Sanford  To: Jana Julien MD  Sent: 7/18/2018 11:16 PM EDT  Subject: Medication Renewal Request    Original authorizing provider: MD Virgil Shaw would like a refill of the following medications:  atorvastatin (LIPITOR) 40 mg tablet Jana Julien MD]    Preferred pharmacy: [place] [address]    Comment:      Medication renewals requested in this message routed to other providers:  ibuprofen (MOTRIN) 600 mg tablet [Violet Sim, DO]

## 2018-07-19 NOTE — TELEPHONE ENCOUNTER
From: Med Lira  To: Shanice Cole DO  Sent: 7/18/2018 11:16 PM EDT  Subject: Medication Renewal Request    Original authorizing provider: DO Hammad Evans would like a refill of the following medications:  ibuprofen (MOTRIN) 600 mg tablet Shanice Cole DO]    Preferred pharmacy: [address] [place] [address]:      Medication renewals requested in this message routed to other providers:  atorvastatin (LIPITOR) 40 mg tablet Francisco Nguyen MD]

## 2018-08-29 ENCOUNTER — TELEPHONE (OUTPATIENT)
Dept: OBGYN CLINIC | Age: 28
End: 2018-08-29

## 2018-08-29 NOTE — TELEPHONE ENCOUNTER
Patient just found out that she is pregnant. Her last menstrual cycle was 7/30/18 and her pregnancy test at home positive on 8/24/18. Patient is tired and sluggish and has a history of anemia. Can we call in the only prenatal vitamin that has been helpful to her in the past to the pharmacy? DHA Prenatal Vitamin Softgels? [facility]   [address]  [address], [address]

## 2018-09-14 ENCOUNTER — OFFICE VISIT (OUTPATIENT)
Dept: OBGYN CLINIC | Age: 28
End: 2018-09-14

## 2018-09-14 ENCOUNTER — TELEPHONE (OUTPATIENT)
Dept: OBGYN CLINIC | Age: 28
End: 2018-09-14

## 2018-09-14 VITALS
HEIGHT: 63 IN | BODY MASS INDEX: 24.45 KG/M2 | SYSTOLIC BLOOD PRESSURE: 120 MMHG | DIASTOLIC BLOOD PRESSURE: 70 MMHG | WEIGHT: 138 LBS

## 2018-09-14 DIAGNOSIS — O20.0 THREATENED MISCARRIAGE: Primary | ICD-10-CM

## 2018-09-14 DIAGNOSIS — O26.891 PELVIC PAIN AFFECTING PREGNANCY IN FIRST TRIMESTER, ANTEPARTUM: ICD-10-CM

## 2018-09-14 DIAGNOSIS — R11.10 HYPEREMESIS: ICD-10-CM

## 2018-09-14 DIAGNOSIS — R10.2 PELVIC PAIN AFFECTING PREGNANCY IN FIRST TRIMESTER, ANTEPARTUM: ICD-10-CM

## 2018-09-14 RX ORDER — ONDANSETRON 8 MG/1
8 TABLET, ORALLY DISINTEGRATING ORAL
Qty: 30 TAB | Refills: 2 | Status: SHIPPED | OUTPATIENT
Start: 2018-09-14

## 2018-09-14 NOTE — TELEPHONE ENCOUNTER
Patient calling stating that she is roughly 6w4d pregnant, LMP 7/30/2018 stating that she wants to be seen before 8 week as she states that is having LLQ cramping that only is present if she lays on her left side. She has had intermittent cramping with walking about 1.5 weeks ago. She is only drinking gatorade and states she does not drink water. She is not having any bleeding, but reports constipation and dark colored stools due to the extra iron in her PNV. Patient has her EOB scheduled for 9/24/2018. Please advise     Does patient need an office appt sooner than 9/24/2018?

## 2018-09-14 NOTE — TELEPHONE ENCOUNTER
Patient aware, scheduled patient for problem visit today. Patient aware that this is only a problem visit not her EOB, patient verbalized understanding.

## 2018-09-14 NOTE — PROGRESS NOTES
Threatened AB note    Hammad Lucas is a ,  33 y.o. female [facility] Patient's last menstrual period was 2018 (exact date). making her 6 weeks pregnant. She has not had prenatal care. She presents with cramping that started back in August. The cramps are described as worsening. She had a positive pregnancy test 2 weeks ago. She has not had a recent pelvic ultrasound. Her past medical history is not significant for risk factors for ectopic pregnancy. She has had pelvic pain on the left. The patient specifically denies any vaginal spotting or bleeding. She does have a history of a spontaneous . She has not had a recent injury or trauma. Additional complaints: nausea and vomiting. Past Medical History:   Diagnosis Date    Abnormal Pap smear     ASCUS + HPV 16    ASCUS with positive high risk human papillomavirus of vagina 2017    Hypercholesteremia 2010    Nausea and vomiting in pregnancy 10/3/2014    Psychiatric disorder     anxiety    Supervision of normal pregnancy 10/3/2014    Upper abdominal pain 2015     Past Surgical History:   Procedure Laterality Date    HX COLPOSCOPY  05/10/2017    benign cells    HX TONSIL AND ADENOIDECTOMY       Social History     Occupational History    Not on file. Social History Main Topics    Smoking status: Current Every Day Smoker     Packs/day: 0.50     Years: 6.00     Types: Cigarettes    Smokeless tobacco: Never Used    Alcohol use No    Drug use: No    Sexual activity: Yes     Partners: Male     Birth control/ protection: None     History reviewed. No pertinent family history. Allergies   Allergen Reactions    Tessalon [Benzonatate] Rash     Prior to Admission medications    Medication Sig Start Date End Date Taking? Authorizing Provider   PNV72-iron carb,glu-FA-dss-dha (CITRANATAL 90 DHA, ALGAL OIL,) 90 mg iron-1 mg -50 mg-300 mg cmpk Take 1 Tab by mouth daily. 8/29/18   Royer Ramos MD   ibuprofen (MOTRIN) 600 mg tablet Take 1 Tab by mouth every eight (8) hours as needed for Pain. 7/19/18   Satya Webster MD   atorvastatin (LIPITOR) 40 mg tablet Take 1 Tab by mouth daily. 7/19/18   Satya Webster MD   SUMAtriptan (IMITREX) 100 mg tablet Take 1 Tab by mouth once as needed for Migraine for up to 1 dose. Repeat 2 hours if not gone, no more than 2 pills in 24 hours 4/18/18   Alex Saez NP   propranolol (INDERAL) 10 mg tablet Take 1 Tab by mouth three (3) times daily as needed. 4/18/18   Alex Saez NP   [address] [place] [address], 0.25-35 mg-mcg tab TAKE 1 TAB BY MOUTH DAILY. INDICATIONS: DYSMENORRHEA 12/18/17   Sammy Hopkins MD   loratadine (CLARITIN) 10 mg tablet Take 1 Tab by mouth daily for 360 days. 11/22/17 11/17/18  Satya Webster MD   albuterol (PROVENTIL VENTOLIN) 2.5 mg /3 mL (0.083 %) nebulizer solution USE 1 AMPULE VIA NEBULIZER EVERY4 HRS AS NEEDED FOR WHEEZING 11/15/17   Alex Saez NP   mometasone (NASONEX) 50 mcg/actuation nasal spray 2 Sprays by Both Nostrils route daily. 2/28/17   Cee Rose NP   LORazepam (ATIVAN) 0.5 mg tablet Take 1 Tab by mouth every eight (8) hours as needed for Anxiety. Max Daily Amount: 1.5 mg. Use sparingly and for extreme anxiety. Must last 30 days. 12/5/16   Alex Saez NP   FLUoxetine (PROZAC) 20 mg capsule 20 mg daily.  1/27/16   Historical Provider        Review of Systems: History obtained from the patient  Constitutional: negative for weight loss, fever, night sweats  Breast: negative for breast lumps, nipple discharge, galactorrhea  GI: negative for change in bowel habits, abdominal pain, black or bloody stools  : negative for frequency, dysuria, hematuria, vaginal discharge  MSK: negative for back pain, joint pain, muscle pain  Skin: negative for itching, rash, hives  Psych: negative for anxiety, depression, change in mood      Objective:  Visit Vitals    /70    Ht 5' 3\" (1.6 m)    Wt 138 lb (62.6 kg)    LMP 2018 (Exact Date)    BMI 24.45 kg/m2       Physical Exam:   PHYSICAL EXAMINATION    Constitutional  · Appearance: well-nourished, well developed, alert, in no acute distress    Gastrointestinal  · Abdominal Examination: abdomen non-tender to palpation, normal bowel sounds, no masses present  · Liver and spleen: no hepatomegaly present, spleen not palpable  · Hernias: no hernias identified    Genitourinary  · External Genitalia: normal appearance for age, no discharge present, no tenderness present, no inflammatory lesions present, no masses present, no atrophy present  · Vagina: normal vaginal vault without central or paravaginal defects, bloody discharge present, no inflammatory lesions present, no masses present  · Bladder: non-tender to palpation  · Urethra: appears normal  · Cervix: normal   · Uterus: enlarged, soft, mildly tender with normal shape and consistency  · Adnexa: no adnexal tenderness present, no adnexal masses present  · Perineum: perineum within normal limits, no evidence of trauma, no rashes or skin lesions present  · Anus: anus within normal limits, no hemorrhoids present  · Inguinal Lymph Nodes: no lymphadenopathy present    Skin  · General Inspection: no rash, no lesions identified    Neurologic/Psychiatric  · Mental Status:  · Orientation: grossly oriented to person, place and time  · Mood and Affect: mood normal, affect appropriate    Assessment:   Threatened AB    Plan:   US  Quantitative HCG's  RTO:    Transvaginal First Trimester Ultrasound Procedure    Hua Bermudez is a ,  33 y.o. female [facility] Patient's last menstrual period was 2018 (exact date). This makes her currently 6 weeks and 4 days of gestation by dates. She is here today for early pregnancy ultrasound for pregnancy dating. Ultrasound results:   A ivey intrauterine pregnancy with visible fetal cardiac flicker activity is seen. The yolk sac is not visible. The gestational sac is visible and measures approximately 0.8 cm in diameter.   The crown rump length of the fetus is not measurable  Subchorionic hemorrhage was not seen  Right Ovary - NORMAL   Left Ovary - NORMAL with CL cyst  Comment: CDS normal

## 2018-09-18 ENCOUNTER — HOSPITAL ENCOUNTER (EMERGENCY)
Age: 28
Discharge: HOME | End: 2018-09-18
Attending: EMERGENCY MEDICINE
Payer: MEDICAID

## 2018-09-18 VITALS
BODY MASS INDEX: 24.45 KG/M2 | HEART RATE: 87 BPM | DIASTOLIC BLOOD PRESSURE: 79 MMHG | TEMPERATURE: 97.5 F | RESPIRATION RATE: 18 BRPM | WEIGHT: 138 LBS | OXYGEN SATURATION: 99 % | HEIGHT: 63 IN | SYSTOLIC BLOOD PRESSURE: 138 MMHG

## 2018-09-18 DIAGNOSIS — O26.859 SPOTTING IN PREGNANCY: Primary | ICD-10-CM

## 2018-09-18 LAB — HCG SERPL-ACNC: ABNORMAL MIU/ML (ref 1–6)

## 2018-09-18 PROCEDURE — 99283 EMERGENCY DEPT VISIT LOW MDM: CPT

## 2018-09-18 PROCEDURE — 84702 CHORIONIC GONADOTROPIN TEST: CPT | Performed by: PHYSICIAN ASSISTANT

## 2018-09-18 NOTE — ED TRIAGE NOTES
Patient with (+) pregnancy test on 8/24/18 and started with vaginal spotting today after intercourse.

## 2018-09-18 NOTE — ED NOTES
Pelvic set up at bedside;  attempted to draw blood on pt; Pt would like to speak with provider prior to any testing sts she is only here for ultrasound

## 2018-09-18 NOTE — ED PROVIDER NOTES
EMERGENCY DEPARTMENT HISTORY AND PHYSICAL EXAM 
 
Date: 9/18/2018 Patient Name: Sanju Grey History of Presenting Illness Chief Complaint Patient presents with  Vaginal Bleeding History Provided By: Patient Chief Complaint: Vaginal bleeding Duration: Earlier today Timing:  Acute Severity: Mild Associated Symptoms: loss of appetite (resolved) Additional History (Context):  
8:15 PM 
Sanju Grey is a 33 y.o. female with PMHX of nausea and vomiting during pregnancy, hypercholesteremia, and psychiatric disorder who presents to the emergency department C/O vaginal bleeding onset earlier today. Associated sxs include loss of appetite (resolved). Pt reports she noted the spotting after intercourse but did not have any blood before. Reports that there was not a large amount of blood and that the episode only lasted for 20-30 minutes. Pt is 5 weeks pregnant (Sunshine Halim). Pt last had an US 4 days ago that showed no complications. Additionally, pt denies any current pain or symptoms and is only concerned for the health of her baby. Pt denies fevers, chills, and any other sxs or complaints. PCP: David Radford MD 
 
Current Outpatient Prescriptions Medication Sig Dispense Refill  ondansetron (ZOFRAN ODT) 8 mg disintegrating tablet Take 1 Tab by mouth every eight (8) hours as needed for Nausea. 30 Tab 2  PNV72-iron carb,glu-FA-dss-dha (CITRANATAL 90 DHA, ALGAL OIL,) 90 mg iron-1 mg -50 mg-300 mg cmpk Take 1 Tab by mouth daily. 90 Each 4  
 ibuprofen (MOTRIN) 600 mg tablet Take 1 Tab by mouth every eight (8) hours as needed for Pain. 30 Tab 1  
 atorvastatin (LIPITOR) 40 mg tablet Take 1 Tab by mouth daily. 90 Tab 1  
 SUMAtriptan (IMITREX) 100 mg tablet Take 1 Tab by mouth once as needed for Migraine for up to 1 dose. Repeat 2 hours if not gone, no more than 2 pills in 24 hours 12 Tab 5  propranolol (INDERAL) 10 mg tablet Take 1 Tab by mouth three (3) times daily as needed. 60 Tab 2  SPRINTEC, 28, 0.25-35 mg-mcg tab TAKE 1 TAB BY MOUTH DAILY. INDICATIONS: DYSMENORRHEA 3 Dose Pack 0  
 loratadine (CLARITIN) 10 mg tablet Take 1 Tab by mouth daily for 360 days. 30 Tab 11  
 albuterol (PROVENTIL VENTOLIN) 2.5 mg /3 mL (0.083 %) nebulizer solution USE 1 AMPULE VIA NEBULIZER EVERY4 HRS AS NEEDED FOR WHEEZING 25 Each 1  
 mometasone (NASONEX) 50 mcg/actuation nasal spray 2 Sprays by Both Nostrils route daily. 1 Container 2  
 LORazepam (ATIVAN) 0.5 mg tablet Take 1 Tab by mouth every eight (8) hours as needed for Anxiety. Max Daily Amount: 1.5 mg. Use sparingly and for extreme anxiety. Must last 30 days. 15 Tab 0  
 FLUoxetine (PROZAC) 20 mg capsule 20 mg daily. 5  
 
 
Past History Past Medical History: 
Past Medical History:  
Diagnosis Date  Abnormal Pap smear ASCUS + HPV 16  
 ASCUS with positive high risk human papillomavirus of vagina 05/2017  Hypercholesteremia 4/26/2010  Nausea and vomiting in pregnancy 10/3/2014  Psychiatric disorder   
 anxiety  Supervision of normal pregnancy 10/3/2014  Upper abdominal pain 1/7/2015 Past Surgical History: 
Past Surgical History:  
Procedure Laterality Date  HX COLPOSCOPY  05/10/2017  
 benign cells  HX TONSIL AND ADENOIDECTOMY Family History: 
History reviewed. No pertinent family history. Social History: 
Social History Substance Use Topics  Smoking status: Current Every Day Smoker Packs/day: 0.50 Years: 6.00 Types: Cigarettes  Smokeless tobacco: Never Used  Alcohol use No  
 
 
Allergies: Allergies Allergen Reactions  Tessalon [Benzonatate] Rash Review of Systems Review of Systems Constitutional: Positive for appetite change (Loss of appetite (resolved)). Negative for chills and fever. Genitourinary: Positive for vaginal bleeding (Lasted for 20-30 minutes). All other systems reviewed and are negative.  
 
 
Physical Exam  
 
 Vitals:  
 18 1902 18 2148 BP: 141/81 138/79 Pulse: (!) 103 87 Resp: 20 18 Temp: 98.6 °F (37 °C) 97.5 °F (36.4 °C) SpO2: 100% 99% Weight: 62.6 kg (138 lb) Height: 5' 3\" (1.6 m) Physical Exam  
Constitutional: She is oriented to person, place, and time. She appears well-developed and well-nourished. HENT:  
Head: Normocephalic and atraumatic. Neck: Normal range of motion. Neck supple. Cardiovascular: Normal rate, regular rhythm, normal heart sounds and intact distal pulses. No murmur heard. Pulmonary/Chest: Effort normal and breath sounds normal. No respiratory distress. She has no wheezes. She has no rales. Abdominal: Soft. Bowel sounds are normal. She exhibits no distension. There is no tenderness. There is no rebound and no guarding. abd non tender Genitourinary: Vagina normal.  
Genitourinary Comments: Cervical os closed to spotting or bleeding seen Neurological: She is alert and oriented to person, place, and time. Psychiatric: She has a normal mood and affect. Judgment normal.  
Nursing note and vitals reviewed. Diagnostic Study Results Labs - No results found for this or any previous visit (from the past 12 hour(s)). Radiologic Studies - No orders to display CT Results  (Last 48 hours) None CXR Results  (Last 48 hours) None Medications given in the ED- Medications - No data to display Medical Decision Making I am the first provider for this patient. I reviewed the vital signs, available nursing notes, past medical history, past surgical history, family history and social history. Vital Signs-Reviewed the patient's vital signs. Pulse Oximetry Analysis - 100% on RA Records Reviewed: Nursing Notes and Old Medical Records Provider Notes (Medical Decision Making): 
 
 
18 Transvaginal First Trimester Ultrasound Procedure Kelton Sanford is a ,  33 y.o. female [facility] Patient's last menstrual period was 07/30/2018 (exact date). This makes her currently 6 weeks and 4 days of gestation by dates. She is here today for early pregnancy ultrasound for pregnancy dating. Ultrasound results: A ivey intrauterine pregnancy with visible fetal cardiac flicker activity is seen. The yolk sac is not visible. The gestational sac is visible and measures approximately 0.8 cm in diameter. The crown rump length of the fetus is not measurable Subchorionic hemorrhage was not seen Right Ovary - NORMAL Left Ovary - NORMAL with CL cyst 
Comment: CDS normal 
 
Pt presents c/o small amount of spotting after intercourse just PTA. Pt is 5 weeks 2 days pregnant, US confirmed IUP, A+ blood type. cervical os is closed, no bleeding seen. Vitals stable, doubt anemia. Will d/c and have pt f/u with OB. Procedures: 
Pelvic Exam 
Date/Time: 9/18/2018 8:26 PM 
Performed by: PA 
Procedure duration:  5 minutes. Documented by:  Channing Yusuf. As dictated by:  MOJGAN Stout Exam assisted by:  Rolan Brooks. Type of exam performed: speculum. External genitalia appearance: normal.   
Vaginal exam:  normal.   
Cervical exam:  normal and os closed. Specimen(s) collected:  none. Bimanual exam: Bimanual Exam not performed. Patient tolerance: Patient tolerated the procedure well with no immediate complications ED Course:  
8:15 PM Initial assessment performed. The patients presenting problems have been discussed, and they are in agreement with the care plan formulated and outlined with them. I have encouraged them to ask questions as they arise throughout their visit. Diagnosis and Disposition DISCHARGE NOTE: 
Romel Banks  results have been reviewed with her. She has been counseled regarding her diagnosis, treatment, and plan.   She verbally conveys understanding and agreement of the signs, symptoms, diagnosis, treatment and prognosis and additionally agrees to follow up as discussed. She also agrees with the care-plan and conveys that all of her questions have been answered. I have also provided discharge instructions for her that include: educational information regarding their diagnosis and treatment, and list of reasons why they would want to return to the ED prior to their follow-up appointment, should her condition change. She has been provided with education for proper emergency department utilization. CLINICAL IMPRESSION: 
 
1. Spotting in pregnancy PLAN: 
1. D/C Home 2. Discharge Medication List as of 9/18/2018  9:06 PM  
  
 
3. Follow-up Information Follow up With Details Comments Contact Info Your OB/GYN Schedule an appointment as soon as possible for a visit in 2 days For OB/GYN follow up Francisco Nguyen MD Schedule an appointment as soon as possible for a visit in 3 days for general follow up [address] [place] [address] 
[phone] [facility] EMERGENCY DEPT Go to As needed, If symptoms worsen [address] [id number] 
[phone]  
  
 
_______________________________ Attestations: This note is prepred by Kuldip Pacheco, acting as Scribe for Christopher Chapa PA-C. Christopher Chapa PA-C:  The scribe's documentation has been prepared under my direction and personally reviewed by me in its entirety. I confirm that the note above accurately reflects all work, treatment, procedures, and medical decision making performed by me. 
_______________________________

## 2018-09-19 NOTE — ED NOTES
Pt discharged home stable and ambulatory. Pain level at discharge 0/10. Pt discharged with self. Reviewed discharged instructions with pt who verbalized understanding. Patient armband removed and shredded

## 2018-09-19 NOTE — DISCHARGE INSTRUCTIONS
Vaginal Bleeding in Nonpregnant Women: Care Instructions  Your Care Instructions    Many women have bleeding or spotting between periods. Lots of things can cause it. You may bleed because of hormone problems, stress, or ovulation. Fibroids and IUDs (intrauterine devices) can also cause bleeding. If your bleeding or spotting is caused by one of these things and is not heavy or doesn't happen often, you probably don't need to worry. But in rare cases, infection, cancer, or other serious conditions can cause bleeding. So you may need more tests to find the cause of your bleeding. The doctor has checked you carefully, but problems can develop later. If you notice any problems or new symptoms, get medical treatment right away. Follow-up care is a key part of your treatment and safety. Be sure to make and go to all appointments, and call your doctor if you are having problems. It's also a good idea to know your test results and keep a list of the medicines you take. How can you care for yourself at home? · Take pain medicines exactly as directed. ¨ If the doctor gave you a prescription medicine for pain, take it as prescribed. ¨ If you are not taking a prescription pain medicine, ask your doctor if you can take an over-the-counter medicine. Do not take aspirin, which may make bleeding worse. · If your doctor prescribed birth control pills for your bleeding, take them as directed. · Eat foods that are high in iron and vitamin C. Foods high in iron include red meat, shellfish, eggs, beans, and leafy green vegetables. Foods high in vitamin C include citrus fruits, tomatoes, and broccoli. Ask your doctor if you need to take iron pills or a multivitamin. · Ask your doctor when it is okay to have sex. When should you call for help? Call 911 anytime you think you may need emergency care.  For example, call if:    · You passed out (lost consciousness).    Call your doctor now or seek immediate medical care if:    · You have severe vaginal bleeding.     · You are dizzy or lightheaded, or you feel like you may faint.     · You have new or worse belly or pelvic pain.    Watch closely for changes in your health, and be sure to contact your doctor if:    · Your bleeding gets worse.     · You think you might be pregnant.     · You do not get better as expected. Where can you learn more? Go to http://emily-heather.info/. Enter J438 in the search box to learn more about \"Vaginal Bleeding in Nonpregnant Women: Care Instructions. \"  Current as of: Kathe 10, 2017  Content Version: 11.7  © 4245-2554 [employer]. Care instructions adapted under license by [employer] (which disclaims liability or warranty for this information). If you have questions about a medical condition or this instruction, always ask your healthcare professional. [address] any warranty or liability for your use of this information.

## 2018-09-21 ENCOUNTER — TELEPHONE (OUTPATIENT)
Dept: OBGYN CLINIC | Age: 28
End: 2018-09-21

## 2018-09-21 NOTE — TELEPHONE ENCOUNTER
Patient calling stating that she is currently pregnant and needs a dental letter faxed to [facility] at [phone]. She has an appt tomorrow. Letter composed, faxed with confirmation received.

## 2018-09-21 NOTE — LETTER
9/21/2018 11:33 AM 
 
 
Ms. Reji Partida [place] [id number] To Dental Consultant, 
 
Reji Partida is in need of dental care. This patient is obstetrically cleared for dental procedures with the following considerations: 1. Please use an abdominal and thyroid shield when performing dental x-rays. 2.  Broad spectrum Penicillins or Cephalosporins and Tylenol products my be 
                 used as needed. Tetracyclines, Nsaids and Quinolones are contraindicated  
                 in pregnancy. 3.  Local Anesthesia without Epinepherine is permissible. Sincerely, Royer Ramos MD

## 2018-09-24 ENCOUNTER — TELEPHONE (OUTPATIENT)
Dept: OBGYN CLINIC | Age: 28
End: 2018-09-24

## 2018-09-24 NOTE — TELEPHONE ENCOUNTER
Patient called stating she cannot take the regular prenatal vitamins she was prescribed. She would like for us to send in a prescription for soft gel tablets? And she would also like to be prescribed something for sleep. Please advise. She is currently 6 weeks pregnant.

## 2018-09-24 NOTE — TELEPHONE ENCOUNTER
Pt states she can not afford over the counter that her prescriptions are cheaper if not free. She is also wanting a prescription for sleep medicine.

## 2018-09-25 ENCOUNTER — TELEPHONE (OUTPATIENT)
Dept: OBGYN CLINIC | Age: 28
End: 2018-09-25

## 2018-09-25 RX ORDER — DIPHENHYDRAMINE HCL 25 MG
25 TABLET ORAL
Qty: 90 TAB | Refills: 0 | Status: SHIPPED | OUTPATIENT
Start: 2018-09-25

## 2018-09-25 NOTE — TELEPHONE ENCOUNTER
If she can't afford OTC gummies, can rx generic tablet PNV which she can mash up and put in pudding, applesauce, etc.  She can try benadryl for sleep--can rx 25 mg, #90, one po q hs prn.

## 2018-09-25 NOTE — TELEPHONE ENCOUNTER
MD ON CALL, PHONE [phone]    Date: September 24    Pregnant: Yes  33 y.o. Niki Cortez Unknown     Attending:   Dr. Fredo Srinivasan    Reason for call:  Wants to take unisom    Plan:  Disc good sleep habits, disc option of benadryl disc rba of unisom, and advise against regular use. Red disc with OB if needs to take more often    I discussed the option of ER evaluation if the symptoms are severe or do not improve or if the patient wants a more thorough evaluation of her concerns that can not be provided over the phone. I advised the caller to contact the office if they have any further questions or concerns.     Yue Reynoso MD

## 2018-10-02 ENCOUNTER — TELEPHONE (OUTPATIENT)
Dept: OBGYN CLINIC | Age: 28
End: 2018-10-02

## 2018-10-02 NOTE — TELEPHONE ENCOUNTER
Spoke with Dr. Gatito Goncalves - advised that the bleeding is likely from either the BM or the intercourse which can take up to a few days to show. Patient is to observe pelvic rest and if the bleeding increases and or the cramping increases she is welcome to call back for an appt or to the ER.    4:35pm - L/M alerting patient of MD recommendations.

## 2018-10-02 NOTE — TELEPHONE ENCOUNTER
Patient has called back in panic that she has not heard back from our office at 3:42 and feels we may not respond immediately before closing today.   She wants a call back to [phone]

## 2018-10-02 NOTE — TELEPHONE ENCOUNTER
Dr. Flo Casanova Patient:    Patient calling and is roughly 8 weeks pregnant and reports that today after getting out of the shower, she noticed some brown spotting that is only with wiping. Patient reports that she had a BM this morning and is suffering from constipation and did push a little harder than normal.  Last episode of intercourse was 2 days ago. She has very minor cramping that comes and goes. She has been drinking gatorade to stay hydrated. Patient advised that the spotting could be from the bowel movement from this morning and her body is flushing it out. She needs to observe pelvic rest, take a stool softener and to increase her water intake. Patient also aware that [facility] is out of the office and that the message is going to the work-in MD.    Please advise.

## 2018-10-08 ENCOUNTER — ROUTINE PRENATAL (OUTPATIENT)
Dept: OBGYN CLINIC | Age: 28
End: 2018-10-08

## 2018-10-08 VITALS
BODY MASS INDEX: 24.45 KG/M2 | DIASTOLIC BLOOD PRESSURE: 80 MMHG | WEIGHT: 138 LBS | SYSTOLIC BLOOD PRESSURE: 136 MMHG | HEIGHT: 63 IN

## 2018-10-08 DIAGNOSIS — O02.1 MISSED AB: Primary | ICD-10-CM

## 2018-10-08 DIAGNOSIS — O20.0 THREATENED MISCARRIAGE: ICD-10-CM

## 2018-10-08 NOTE — PROGRESS NOTES
Threatened AB note    Vinicius Cook is a ,  33 y.o. female [facility] Patient's last menstrual period was 2018 (exact date). making her 10 weeks pregnant. She has not had prenatal care. She presents with no symptoms. She has not passed tissue. She had a positive pregnancy test 3 weeks ago. She has had a recent pelvic ultrasound that showed   A ivey intrauterine pregnancy with visible fetal cardiac flicker activity is seen. The yolk sac is not visible. The gestational sac is visible and measures approximately 0.8 cm in diameter. The crown rump length of the fetus is not measurable  Subchorionic hemorrhage was not seen  Right Ovary - NORMAL   Left Ovary - NORMAL with CL cyst  Comment: CDS normal.    Her past medical history is not significant for risk factors for ectopic pregnancy. She has not had pelvic pain. The patient specifically denies right or left pelvic pain. She does have a history of a spontaneous . She has not had a recent injury or trauma. Additional complaints: none. Past Medical History:   Diagnosis Date    Abnormal Pap smear     ASCUS + HPV 16    ASCUS with positive high risk human papillomavirus of vagina 2017    Hypercholesteremia 2010    Nausea and vomiting in pregnancy 10/3/2014    Psychiatric disorder     anxiety    Supervision of normal pregnancy 10/3/2014    Upper abdominal pain 2015     Past Surgical History:   Procedure Laterality Date    HX COLPOSCOPY  05/10/2017    benign cells    HX TONSIL AND ADENOIDECTOMY       Social History     Occupational History    Not on file.      Social History Main Topics    Smoking status: Current Every Day Smoker     Packs/day: 0.50     Years: 6.00     Types: Cigarettes    Smokeless tobacco: Never Used   Ventura Payne Alcohol use No    Drug use: No    Sexual activity: Yes     Partners: Male     Birth control/ protection: None     History reviewed. No pertinent family history. Allergies   Allergen Reactions    Tessalon [Benzonatate] Rash     Prior to Admission medications    Medication Sig Start Date End Date Taking? Authorizing Provider   PNV62-FA-om3-dha-epa-fish oil (PRENATAL GUMMY) 400 mcg-35 mg -25 mg-5 mg chew Take 1 Tab by mouth daily. 9/25/18   Adeola Branham MD   diphenhydrAMINE (BENADRYL) 25 mg tablet Take 1 Tab by mouth nightly as needed for Sleep. 9/25/18   Adeola Branham MD   ondansetron (ZOFRAN ODT) 8 mg disintegrating tablet Take 1 Tab by mouth every eight (8) hours as needed for Nausea. 9/14/18   Adeola Branham MD   PFB69-glwk carb,glu-FA-dss-dha (CITRANATAL 90 DHA, ALGAL OIL,) 90 mg iron-1 mg -50 mg-300 mg cmpk Take 1 Tab by mouth daily. 8/29/18   Adeola Branham MD   ibuprofen (MOTRIN) 600 mg tablet Take 1 Tab by mouth every eight (8) hours as needed for Pain. 7/19/18   Refugio Holter, MD   atorvastatin (LIPITOR) 40 mg tablet Take 1 Tab by mouth daily. 7/19/18   Refugio Holter, MD   SUMAtriptan (IMITREX) 100 mg tablet Take 1 Tab by mouth once as needed for Migraine for up to 1 dose. Repeat 2 hours if not gone, no more than 2 pills in 24 hours 4/18/18   Jerry Miles NP   propranolol (INDERAL) 10 mg tablet Take 1 Tab by mouth three (3) times daily as needed. 4/18/18   Jerry Miles NP   [address] [address], 0.25-35 mg-mcg tab TAKE 1 TAB BY MOUTH DAILY. INDICATIONS: DYSMENORRHEA 12/18/17   Marin Frazier MD   loratadine (CLARITIN) 10 mg tablet Take 1 Tab by mouth daily for 360 days. 11/22/17 11/17/18  Refugio Holter, MD   albuterol (PROVENTIL VENTOLIN) 2.5 mg /3 mL (0.083 %) nebulizer solution USE 1 AMPULE VIA NEBULIZER EVERY4 HRS AS NEEDED FOR WHEEZING 11/15/17   Jerry Miles, SHERRY   mometasone (NASONEX) 50 mcg/actuation nasal spray 2 Sprays by Both Nostrils route daily.  2/28/17   Maryellen Aranda, NP   LORazepam (ATIVAN) 0.5 mg tablet Take 1 Tab by mouth every eight (8) hours as needed for Anxiety. Max Daily Amount: 1.5 mg. Use sparingly and for extreme anxiety. Must last 30 days. 12/5/16   Lucina Cardona NP   FLUoxetine (PROZAC) 20 mg capsule 20 mg daily.  1/27/16   Historical Provider        Review of Systems: History obtained from the patient  Constitutional: negative for weight loss, fever, night sweats  Breast: negative for breast lumps, nipple discharge, galactorrhea  GI: negative for change in bowel habits, abdominal pain, black or bloody stools  : negative for frequency, dysuria, hematuria, vaginal discharge  MSK: negative for back pain, joint pain, muscle pain  Skin: negative for itching, rash, hives  Psych: negative for anxiety, depression, change in mood      Objective:  Visit Vitals    /80    Ht 5' 3\" (1.6 m)    Wt 138 lb (62.6 kg)    LMP 07/30/2018 (Exact Date)    BMI 24.45 kg/m2       Physical Exam:   PHYSICAL EXAMINATION    Constitutional  · Appearance: well-nourished, well developed, alert, in no acute distress    Gastrointestinal  · Abdominal Examination: abdomen non-tender to palpation, normal bowel sounds, no masses present  · Liver and spleen: no hepatomegaly present, spleen not palpable  · Hernias: no hernias identified    Genitourinary  · External Genitalia: normal appearance for age, no discharge present, no tenderness present, no inflammatory lesions present, no masses present, no atrophy present  · Vagina: normal vaginal vault without central or paravaginal defects, bloody discharge present, no inflammatory lesions present, no masses present  · Bladder: non-tender to palpation  · Urethra: appears normal  · Cervix: normal   · Uterus: enlarged, soft, mildly tender with normal shape and consistency  · Adnexa: no adnexal tenderness present, no adnexal masses present  · Perineum: perineum within normal limits, no evidence of trauma, no rashes or skin lesions present  · Anus: anus within normal limits, no hemorrhoids present  · Inguinal Lymph Nodes: no lymphadenopathy present    Skin  · General Inspection: no rash, no lesions identified    Neurologic/Psychiatric  · Mental Status:  · Orientation: grossly oriented to person, place and time  · Mood and Affect: mood normal, affect appropriate      Transvaginal First Trimester Ultrasound Procedure    Aileen Apodaca is a ,  33 y.o. female [facility] Patient's last menstrual period was 2018 (exact date). This makes her currently 10 weeks and 0 days of gestation by dates. She is here today for early pregnancy ultrasound for pregnancy viability   Ultrasound results:   A ivey intrauterine pregnancy with no visible cardiac activity is seen. The yolk sac is not visible. The crown rump length of the fetus is measurable at 1.63 cm, consistent with 8 weeks and 0 days  Subchorionic hemorrhage was seen around 2/3 of the placenta  Comment: the fetus has a low return of echoes, consistent with edema. Assessment:   Missed AB  US confirmed  Pt states her blood type is A pos. Plan:   Discussed alternatives of waiting vs D & C.  RTO: 2 weeks for follow up. Call If wants to proceed with surgery.

## 2018-10-10 ENCOUNTER — OFFICE VISIT (OUTPATIENT)
Dept: OBGYN CLINIC | Age: 28
End: 2018-10-10

## 2018-10-10 VITALS — DIASTOLIC BLOOD PRESSURE: 84 MMHG | BODY MASS INDEX: 24.8 KG/M2 | SYSTOLIC BLOOD PRESSURE: 130 MMHG | WEIGHT: 140 LBS

## 2018-10-10 DIAGNOSIS — O02.1 MISSED AB: Primary | ICD-10-CM

## 2018-10-10 NOTE — MR AVS SNAPSHOT
[address] [facility] [address] [address] [facility] 
[phone] Patient: Donavon January MRN: [id number] :1990 Visit Information Date & Time Provider Department Dept. Phone Encounter #  
 10/10/2018  2:40 PM Silver Elliott, Ashlie Potter [phone] [id number] Upcoming Health Maintenance Date Due Influenza Age 5 to Adult 2018 PAP AKA CERVICAL CYTOLOGY 3/16/2020 Allergies as of 10/10/2018  Review Complete On: 10/8/2018 By: Nima Villarreal Severity Noted Reaction Type Reactions Tessalon [Benzonatate]  2017    Rash Current Immunizations  Reviewed on 2015 No immunizations on file. Not reviewed this visit Vitals BP Weight(growth percentile) LMP BMI OB Status Smoking Status 130/84 140 lb (63.5 kg) 2018 (Exact Date) 24.8 kg/m2 Having regular periods Current Every Day Smoker BMI and BSA Data Body Mass Index Body Surface Area  
 24.8 kg/m 2 1.68 m 2 Preferred Pharmacy Pharmacy Name Phone [id number] [place], [address] [facility] [phone] Your Updated Medication List  
  
   
This list is accurate as of 10/10/18  3:05 PM.  Always use your most recent med list.  
  
  
  
  
 albuterol 2.5 mg /3 mL (0.083 %) nebulizer solution Commonly known as:  PROVENTIL VENTOLIN  
USE 1 AMPULE VIA NEBULIZER EVERY4 HRS AS NEEDED FOR WHEEZING  
  
 atorvastatin 40 mg tablet Commonly known as:  LIPITOR Take 1 Tab by mouth daily. diphenhydrAMINE 25 mg tablet Commonly known as:  BENADRYL Take 1 Tab by mouth nightly as needed for Sleep. FLUoxetine 20 mg capsule Commonly known as:  PROzac  
20 mg daily. ibuprofen 600 mg tablet Commonly known as:  MOTRIN Take 1 Tab by mouth every eight (8) hours as needed for Pain.  
  
 loratadine 10 mg tablet Commonly known as:  Tre Bal  
 Take 1 Tab by mouth daily for 360 days. LORazepam 0.5 mg tablet Commonly known as:  ATIVAN Take 1 Tab by mouth every eight (8) hours as needed for Anxiety. Max Daily Amount: 1.5 mg. Use sparingly and for extreme anxiety. Must last 30 days. mometasone 50 mcg/actuation nasal spray Commonly known as:  NASONEX  
2 Sprays by Both Nostrils route daily. ondansetron 8 mg disintegrating tablet Commonly known as:  ZOFRAN ODT Take 1 Tab by mouth every eight (8) hours as needed for Nausea. PNV62-FA-om3-dha-epa-fish oil 400 mcg-35 mg -25 mg-5 mg [place] Commonly known as:  PRENATAL GUMMY Take 1 Tab by mouth daily. PNV72-iron carb,glu-FA-dss-dha 90 mg iron-1 mg -50 mg-300 mg Cmpk Commonly known as:  CITRANATAL 90 DHA (ALGAL OIL) Take 1 Tab by mouth daily. propranolol 10 mg tablet Commonly known as:  INDERAL Take 1 Tab by mouth three (3) times daily as needed. [facility] () 0.25-35 mg-mcg Tab Generic drug:  norgestimate-ethinyl estradiol TAKE 1 TAB BY MOUTH DAILY. INDICATIONS: DYSMENORRHEA  
  
 SUMAtriptan 100 mg tablet Commonly known as:  IMITREX Take 1 Tab by mouth once as needed for Migraine for up to 1 dose. Repeat 2 hours if not gone, no more than 2 pills in 24 hours Introducing [facility]! Dear Adan White: 
Thank you for requesting a Tiltap account. Our records indicate that you already have an active Tiltap account. You can access your account anytime at [email or web address] Did you know that you can access your hospital and ER discharge instructions at any time in Tiltap? You can also review all of your test results from your hospital stay or ER visit. Additional Information If you have questions, please visit the Frequently Asked Questions section of the Tiltap website at [email or web address] Remember, Tiltap is NOT to be used for urgent needs. For medical emergencies, dial 911. Now available from your iPhone and Android! Please provide this summary of care documentation to your next provider. Your primary care clinician is listed as WENCESLAO LAWSON. If you have any questions after today's visit, please call [phone].

## 2018-10-10 NOTE — PROGRESS NOTES
Threatened AB follow up note      She is here today to discuss surgery for missed ab.     Mitchell Martinez is a  [phone],  33 y.o. female [facility] [place] Patient's last menstrual period was 2018 (exact date). making her 10 weeks pregnant. She has not had prenatal care. She presents with no symptoms. She has not passed tissue. She had a positive pregnancy test 3 weeks ago. She has had a recent pelvic ultrasound that showed:    Currently 10 weeks and 0 days of gestation by dates. She is here today for early pregnancy ultrasound for pregnancy viability   Ultrasound results:   A ivey intrauterine pregnancy with no visible cardiac activity is seen. The yolk sac is not visible. The crown rump length of the fetus is measurable at 1.63 cm, consistent with 8 weeks and 0 days  Subchorionic hemorrhage was seen around 2/3 of the placenta  Comment: the fetus has a low return of echoes, consistent with edema. Her past medical history is not significant for risk factors for ectopic pregnancy. She has not had pelvic pain. The patient specifically denies right or left pelvic pain. She does have a history of a spontaneous . She has not had a recent injury or trauma. Additional complaints: none.      Past Medical History:   Diagnosis Date    Abnormal Pap smear     ASCUS + HPV 16    ASCUS with positive high risk human papillomavirus of vagina 2017    Hypercholesteremia 2010    Nausea and vomiting in pregnancy 10/3/2014    Psychiatric disorder     anxiety    Supervision of normal pregnancy 10/3/2014    Upper abdominal pain 2015     Past Surgical History:   Procedure Laterality Date    HX COLPOSCOPY  05/10/2017    benign cells    HX TONSIL AND ADENOIDECTOMY       Social History     Occupational History  Not on file. Social History Main Topics    Smoking status: Current Every Day Smoker     Packs/day: 0.50     Years: 6.00     Types: Cigarettes    Smokeless tobacco: Never Used    Alcohol use No    Drug use: No    Sexual activity: Yes     Partners: Male     Birth control/ protection: None     History reviewed. No pertinent family history. Allergies   Allergen Reactions    Tessalon [Benzonatate] Rash     Prior to Admission medications    Medication Sig Start Date End Date Taking? Authorizing Provider   PNV62-FA-om3-dha-epa-fish oil (PRENATAL GUMMY) 400 mcg-35 mg -25 mg-5 mg chew Take 1 Tab by mouth daily. 9/25/18   Ade Cunha MD   diphenhydrAMINE (BENADRYL) 25 mg tablet Take 1 Tab by mouth nightly as needed for Sleep. 9/25/18   Ade Cunha MD   ondansetron (ZOFRAN ODT) 8 mg disintegrating tablet Take 1 Tab by mouth every eight (8) hours as needed for Nausea. 9/14/18   Ade Cunha MD   YXD04-runf carb,glu-FA-dss-dha (CITRANATAL 90 DHA, ALGAL OIL,) 90 mg iron-1 mg -50 mg-300 mg cmpk Take 1 Tab by mouth daily. 8/29/18   Ade Cunha MD   ibuprofen (MOTRIN) 600 mg tablet Take 1 Tab by mouth every eight (8) hours as needed for Pain. 7/19/18   Edel Stafford MD   atorvastatin (LIPITOR) 40 mg tablet Take 1 Tab by mouth daily. 7/19/18   Edel Stafford MD   SUMAtriptan (IMITREX) 100 mg tablet Take 1 Tab by mouth once as needed for Migraine for up to 1 dose. Repeat 2 hours if not gone, no more than 2 pills in 24 hours 4/18/18   Ama Jung NP   propranolol (INDERAL) 10 mg tablet Take 1 Tab by mouth three (3) times daily as needed. 4/18/18   Ama Jung NP   [address] [address], 0.25-35 mg-mcg tab TAKE 1 TAB BY MOUTH DAILY. INDICATIONS: DYSMENORRHEA 12/18/17   Luther Valera MD   loratadine (CLARITIN) 10 mg tablet Take 1 Tab by mouth daily for 360 days.  11/22/17 11/17/18  Edel Stafford MD   albuterol (PROVENTIL VENTOLIN) 2.5 mg /3 mL (0.083 %) nebulizer solution USE 1 AMPULE VIA NEBULIZER EVERY4 HRS AS NEEDED FOR WHEEZING 11/15/17   Phyllis Stanley NP   mometasone (NASONEX) 50 mcg/actuation nasal spray 2 Sprays by Both Nostrils route daily. 2/28/17   Rony Theodore NP   LORazepam (ATIVAN) 0.5 mg tablet Take 1 Tab by mouth every eight (8) hours as needed for Anxiety. Max Daily Amount: 1.5 mg. Use sparingly and for extreme anxiety. Must last 30 days. 12/5/16   Phyllis Stanley NP   FLUoxetine (PROZAC) 20 mg capsule 20 mg daily. 1/27/16   Historical Provider        Review of Systems: History obtained from the patient  Constitutional: negative for weight loss, fever, night sweats  Breast: negative for breast lumps, nipple discharge, galactorrhea  GI: negative for change in bowel habits, abdominal pain, black or bloody stools  : negative for frequency, dysuria, hematuria, vaginal discharge  MSK: negative for back pain, joint pain, muscle pain  Skin: negative for itching, rash, hives  Psych: negative for anxiety, depression, change in mood      Objective:  Visit Vitals    /84    Wt 140 lb (63.5 kg)    LMP 07/30/2018 (Exact Date)    BMI 24.8 kg/m2       Physical Exam:   PHYSICAL EXAMINATION    Constitutional  · Appearance: well-nourished, well developed, alert, in no acute distress    Skin  · General Inspection: no rash, no lesions identified    Neurologic/Psychiatric  · Mental Status:  · Orientation: grossly oriented to person, place and time  · Mood and Affect: mood normal, affect appropriate        Assessment:   Missed AB  US confirmed  Pt states her blood type is A pos. Plan:   Wants to have D & C.  RTO: 2 weeks for follow up.

## 2018-10-11 DIAGNOSIS — O02.1 MISSED AB: Primary | ICD-10-CM

## 2018-10-12 ENCOUNTER — ANESTHESIA EVENT (OUTPATIENT)
Dept: SURGERY | Age: 28
End: 2018-10-12
Payer: MEDICAID

## 2018-10-12 NOTE — PERIOP NOTES
Pt advised to call the office of Dr. Kiara Mireles regarding concerns. Pt stated she is worried she will become infertile and hemorrhage after having procedure.

## 2018-10-15 ENCOUNTER — HOSPITAL ENCOUNTER (OUTPATIENT)
Age: 28
Setting detail: OUTPATIENT SURGERY
Discharge: HOME | End: 2018-10-15
Attending: OBSTETRICS & GYNECOLOGY | Admitting: OBSTETRICS & GYNECOLOGY
Payer: MEDICAID

## 2018-10-15 ENCOUNTER — ANESTHESIA (OUTPATIENT)
Dept: SURGERY | Age: 28
End: 2018-10-15
Payer: MEDICAID

## 2018-10-15 VITALS
DIASTOLIC BLOOD PRESSURE: 64 MMHG | OXYGEN SATURATION: 96 % | RESPIRATION RATE: 13 BRPM | TEMPERATURE: 97.6 F | HEART RATE: 65 BPM | SYSTOLIC BLOOD PRESSURE: 113 MMHG | HEIGHT: 63 IN | WEIGHT: 141.76 LBS | BODY MASS INDEX: 25.12 KG/M2

## 2018-10-15 DIAGNOSIS — O02.1 MISSED AB: ICD-10-CM

## 2018-10-15 LAB
ABO + RH BLD: NORMAL
ANTIGENS PRESENT RBC DONR: NORMAL
ANTIGENS PRESENT RBC DONR: NORMAL
BLD PROD TYP BPU: NORMAL
BLD PROD TYP BPU: NORMAL
BLOOD BANK CMNT PATIENT-IMP: NORMAL
BLOOD GROUP ANTIBODIES SERPL: NORMAL
BPU ID: NORMAL
BPU ID: NORMAL
CROSSMATCH RESULT,%XM: NORMAL
CROSSMATCH RESULT,%XM: NORMAL
ERYTHROCYTE [DISTWIDTH] IN BLOOD BY AUTOMATED COUNT: 13.4 % (ref 11.5–14.5)
HCT VFR BLD AUTO: 38.8 % (ref 35–47)
HGB BLD-MCNC: 13 G/DL (ref 11.5–16)
MCH RBC QN AUTO: 30.7 PG (ref 26–34)
MCHC RBC AUTO-ENTMCNC: 33.5 G/DL (ref 30–36.5)
MCV RBC AUTO: 91.5 FL (ref 80–99)
NRBC # BLD: 0 K/UL (ref 0–0.01)
NRBC BLD-RTO: 0 PER 100 WBC
PLATELET # BLD AUTO: 192 K/UL (ref 150–400)
PMV BLD AUTO: 11 FL (ref 8.9–12.9)
RBC # BLD AUTO: 4.24 M/UL (ref 3.8–5.2)
SPECIMEN EXP DATE BLD: NORMAL
STATUS OF UNIT,%ST: NORMAL
STATUS OF UNIT,%ST: NORMAL
UNIT DIVISION, %UDIV: 0
UNIT DIVISION, %UDIV: 0
WBC # BLD AUTO: 6.5 K/UL (ref 3.6–11)

## 2018-10-15 PROCEDURE — 86920 COMPATIBILITY TEST SPIN: CPT | Performed by: OBSTETRICS & GYNECOLOGY

## 2018-10-15 PROCEDURE — 77030005537 HC CATH URETH BARD -A: Performed by: OBSTETRICS & GYNECOLOGY

## 2018-10-15 PROCEDURE — 76210000006 HC OR PH I REC 0.5 TO 1 HR: Performed by: OBSTETRICS & GYNECOLOGY

## 2018-10-15 PROCEDURE — 77030020143 HC AIRWY LARYN INTUB CGAS -A: Performed by: ANESTHESIOLOGY

## 2018-10-15 PROCEDURE — 77030011210: Performed by: OBSTETRICS & GYNECOLOGY

## 2018-10-15 PROCEDURE — 86902 BLOOD TYPE ANTIGEN DONOR EA: CPT | Performed by: OBSTETRICS & GYNECOLOGY

## 2018-10-15 PROCEDURE — 74011250636 HC RX REV CODE- 250/636

## 2018-10-15 PROCEDURE — 86921 COMPATIBILITY TEST INCUBATE: CPT | Performed by: OBSTETRICS & GYNECOLOGY

## 2018-10-15 PROCEDURE — 76060000032 HC ANESTHESIA 0.5 TO 1 HR: Performed by: OBSTETRICS & GYNECOLOGY

## 2018-10-15 PROCEDURE — 77030020782 HC GWN BAIR PAWS FLX 3M -B

## 2018-10-15 PROCEDURE — 77030018836 HC SOL IRR NACL ICUM -A: Performed by: OBSTETRICS & GYNECOLOGY

## 2018-10-15 PROCEDURE — 36415 COLL VENOUS BLD VENIPUNCTURE: CPT | Performed by: OBSTETRICS & GYNECOLOGY

## 2018-10-15 PROCEDURE — 86900 BLOOD TYPING SEROLOGIC ABO: CPT | Performed by: OBSTETRICS & GYNECOLOGY

## 2018-10-15 PROCEDURE — 74011250636 HC RX REV CODE- 250/636: Performed by: ANESTHESIOLOGY

## 2018-10-15 PROCEDURE — 76210000021 HC REC RM PH II 0.5 TO 1 HR: Performed by: OBSTETRICS & GYNECOLOGY

## 2018-10-15 PROCEDURE — 86922 COMPATIBILITY TEST ANTIGLOB: CPT | Performed by: OBSTETRICS & GYNECOLOGY

## 2018-10-15 PROCEDURE — 76010000138 HC OR TIME 0.5 TO 1 HR: Performed by: OBSTETRICS & GYNECOLOGY

## 2018-10-15 PROCEDURE — 88305 TISSUE EXAM BY PATHOLOGIST: CPT | Performed by: OBSTETRICS & GYNECOLOGY

## 2018-10-15 PROCEDURE — 85027 COMPLETE CBC AUTOMATED: CPT | Performed by: OBSTETRICS & GYNECOLOGY

## 2018-10-15 PROCEDURE — 77030008578 HC TBNG UTER SUC BUSS -A: Performed by: OBSTETRICS & GYNECOLOGY

## 2018-10-15 RX ORDER — SODIUM CHLORIDE 0.9 % (FLUSH) 0.9 %
5-10 SYRINGE (ML) INJECTION AS NEEDED
Status: DISCONTINUED | OUTPATIENT
Start: 2018-10-15 | End: 2018-10-15 | Stop reason: HOSPADM

## 2018-10-15 RX ORDER — LIDOCAINE HYDROCHLORIDE 10 MG/ML
0.1 INJECTION, SOLUTION EPIDURAL; INFILTRATION; INTRACAUDAL; PERINEURAL AS NEEDED
Status: DISCONTINUED | OUTPATIENT
Start: 2018-10-15 | End: 2018-10-15 | Stop reason: HOSPADM

## 2018-10-15 RX ORDER — SODIUM CHLORIDE, SODIUM LACTATE, POTASSIUM CHLORIDE, CALCIUM CHLORIDE 600; 310; 30; 20 MG/100ML; MG/100ML; MG/100ML; MG/100ML
125 INJECTION, SOLUTION INTRAVENOUS CONTINUOUS
Status: DISCONTINUED | OUTPATIENT
Start: 2018-10-15 | End: 2018-10-15 | Stop reason: HOSPADM

## 2018-10-15 RX ORDER — HYDROMORPHONE HYDROCHLORIDE 1 MG/ML
.25-1 INJECTION, SOLUTION INTRAMUSCULAR; INTRAVENOUS; SUBCUTANEOUS
Status: DISCONTINUED | OUTPATIENT
Start: 2018-10-15 | End: 2018-10-15 | Stop reason: HOSPADM

## 2018-10-15 RX ORDER — DIPHENHYDRAMINE HYDROCHLORIDE 50 MG/ML
12.5 INJECTION, SOLUTION INTRAMUSCULAR; INTRAVENOUS AS NEEDED
Status: DISCONTINUED | OUTPATIENT
Start: 2018-10-15 | End: 2018-10-15 | Stop reason: HOSPADM

## 2018-10-15 RX ORDER — LIDOCAINE HYDROCHLORIDE 20 MG/ML
INJECTION, SOLUTION EPIDURAL; INFILTRATION; INTRACAUDAL; PERINEURAL AS NEEDED
Status: DISCONTINUED | OUTPATIENT
Start: 2018-10-15 | End: 2018-10-15 | Stop reason: HOSPADM

## 2018-10-15 RX ORDER — PROPOFOL 10 MG/ML
INJECTION, EMULSION INTRAVENOUS AS NEEDED
Status: DISCONTINUED | OUTPATIENT
Start: 2018-10-15 | End: 2018-10-15 | Stop reason: HOSPADM

## 2018-10-15 RX ORDER — DEXAMETHASONE SODIUM PHOSPHATE 4 MG/ML
INJECTION, SOLUTION INTRA-ARTICULAR; INTRALESIONAL; INTRAMUSCULAR; INTRAVENOUS; SOFT TISSUE AS NEEDED
Status: DISCONTINUED | OUTPATIENT
Start: 2018-10-15 | End: 2018-10-15 | Stop reason: HOSPADM

## 2018-10-15 RX ORDER — NALOXONE HYDROCHLORIDE 0.4 MG/ML
0.2 INJECTION, SOLUTION INTRAMUSCULAR; INTRAVENOUS; SUBCUTANEOUS
Status: DISCONTINUED | OUTPATIENT
Start: 2018-10-15 | End: 2018-10-15 | Stop reason: HOSPADM

## 2018-10-15 RX ORDER — FENTANYL CITRATE 50 UG/ML
INJECTION, SOLUTION INTRAMUSCULAR; INTRAVENOUS AS NEEDED
Status: DISCONTINUED | OUTPATIENT
Start: 2018-10-15 | End: 2018-10-15 | Stop reason: HOSPADM

## 2018-10-15 RX ORDER — MIDAZOLAM HYDROCHLORIDE 1 MG/ML
INJECTION, SOLUTION INTRAMUSCULAR; INTRAVENOUS AS NEEDED
Status: DISCONTINUED | OUTPATIENT
Start: 2018-10-15 | End: 2018-10-15 | Stop reason: HOSPADM

## 2018-10-15 RX ORDER — SODIUM CHLORIDE 0.9 % (FLUSH) 0.9 %
5-10 SYRINGE (ML) INJECTION EVERY 8 HOURS
Status: DISCONTINUED | OUTPATIENT
Start: 2018-10-15 | End: 2018-10-15 | Stop reason: HOSPADM

## 2018-10-15 RX ORDER — ONDANSETRON 2 MG/ML
INJECTION INTRAMUSCULAR; INTRAVENOUS AS NEEDED
Status: DISCONTINUED | OUTPATIENT
Start: 2018-10-15 | End: 2018-10-15 | Stop reason: HOSPADM

## 2018-10-15 RX ORDER — FLUMAZENIL 0.1 MG/ML
0.2 INJECTION INTRAVENOUS
Status: DISCONTINUED | OUTPATIENT
Start: 2018-10-15 | End: 2018-10-15 | Stop reason: HOSPADM

## 2018-10-15 RX ORDER — KETOROLAC TROMETHAMINE 30 MG/ML
INJECTION, SOLUTION INTRAMUSCULAR; INTRAVENOUS AS NEEDED
Status: DISCONTINUED | OUTPATIENT
Start: 2018-10-15 | End: 2018-10-15 | Stop reason: HOSPADM

## 2018-10-15 RX ADMIN — FENTANYL CITRATE 50 MCG: 50 INJECTION, SOLUTION INTRAMUSCULAR; INTRAVENOUS at 12:44

## 2018-10-15 RX ADMIN — ONDANSETRON 4 MG: 2 INJECTION INTRAMUSCULAR; INTRAVENOUS at 12:51

## 2018-10-15 RX ADMIN — SODIUM CHLORIDE, SODIUM LACTATE, POTASSIUM CHLORIDE, AND CALCIUM CHLORIDE 125 ML/HR: 600; 310; 30; 20 INJECTION, SOLUTION INTRAVENOUS at 10:08

## 2018-10-15 RX ADMIN — HYDROMORPHONE HYDROCHLORIDE 0.5 MG: 1 INJECTION, SOLUTION INTRAMUSCULAR; INTRAVENOUS; SUBCUTANEOUS at 13:45

## 2018-10-15 RX ADMIN — KETOROLAC TROMETHAMINE 30 MG: 30 INJECTION, SOLUTION INTRAMUSCULAR; INTRAVENOUS at 12:51

## 2018-10-15 RX ADMIN — PROPOFOL 150 MG: 10 INJECTION, EMULSION INTRAVENOUS at 12:44

## 2018-10-15 RX ADMIN — LIDOCAINE HYDROCHLORIDE 80 MG: 20 INJECTION, SOLUTION EPIDURAL; INFILTRATION; INTRACAUDAL; PERINEURAL at 12:44

## 2018-10-15 RX ADMIN — HYDROMORPHONE HYDROCHLORIDE 0.5 MG: 1 INJECTION, SOLUTION INTRAMUSCULAR; INTRAVENOUS; SUBCUTANEOUS at 13:21

## 2018-10-15 RX ADMIN — FENTANYL CITRATE 50 MCG: 50 INJECTION, SOLUTION INTRAMUSCULAR; INTRAVENOUS at 12:51

## 2018-10-15 RX ADMIN — DEXAMETHASONE SODIUM PHOSPHATE 4 MG: 4 INJECTION, SOLUTION INTRA-ARTICULAR; INTRALESIONAL; INTRAMUSCULAR; INTRAVENOUS; SOFT TISSUE at 12:51

## 2018-10-15 RX ADMIN — MIDAZOLAM HYDROCHLORIDE 2 MG: 1 INJECTION, SOLUTION INTRAMUSCULAR; INTRAVENOUS at 12:33

## 2018-10-15 NOTE — DISCHARGE INSTRUCTIONS
Dilation and Curettage (D&C): What to Expect at Home  Your Recovery  Dilation and curettage (D&C) is a procedure to remove tissue from the inside of the uterus. The doctor used instruments to gently scrape tissue from your uterus. You may have a backache, or cramps similar to menstrual cramps, and pass small clots of blood from your vagina for the first few days. You may continue to have light vaginal bleeding for several weeks after the procedure. You will probably be able to go back to most of your normal activities in 1 or 2 days. This care sheet gives you a general idea about how long it will take for you to recover. But each person recovers at a different pace. Follow the steps below to get better as quickly as possible. How can you care for yourself at home? Activity  · Rest when you feel tired. Getting enough sleep will help you recover. · You may resume strenuous activities, such as bicycle riding, jogging, weight lifting, or aerobic exercise, when you feel able to do so. · Most women are able to return to work the day after the procedure. · You may have some light vaginal bleeding. Wear sanitary pads if needed. Do not douche or use tampons for 2 weeks or until your doctor says it is okay. · Ask your doctor when it is okay for you to have sex--usually after bleeding has stopped. · Use condoms to prevent pregnancy if necessary. Diet  · You can eat your normal diet. If your stomach is upset, try bland, low-fat foods like plain rice, broiled chicken, toast, and yogurt. · Drink fluids as you would normally. Medicines  · Take pain medicines exactly as directed. ¨ If the doctor gave you a prescription medicine for pain, take it as prescribed. ¨ If you are not taking a prescription pain medicine, take Aleve or Advil as needed unless you are allergic. · If you think your pain medicine is making you sick to your stomach:  ¨ Take your medicine with food.   ¨ Ask your doctor for a different pain medicine. · If your doctor prescribed antibiotics, take them as directed. Do not stop taking them just because you feel better. Follow-up appointment is usually 2 or 3 weeks after surgery. Be sure to make an appointment if you don't have one already, and call your doctor if you are having problems. It's also a good idea to know your test results and keep a list of the medicines you take. When should you call for help? Call 911 anytime you think you may need emergency care. For example, call if:  · You passed out (lost consciousness). · You have severe trouble breathing. · You have chest pain and shortness of breath, or you cough up blood. · You have severe pain in your belly. Call your doctor now or seek immediate medical care if:  · You have bright red vaginal bleeding that soaks one or more pads each hour for 2 or more hours. · You pass blood clots that are larger than a golf ball. · You have vaginal discharge that smells bad. · You are sick to your stomach or cannot keep fluids down. · You have pain that does not get better after you take pain medicine. · You have pain that is getting worse 2 days after the procedure. · You have a fever over 100°F.  · Your belly feels tender, or full and hard. DISCHARGE SUMMARY from your Nurse    The following personal items collected during your admission are returned to you:   Dental Appliance: Dental Appliances:  (poor dentition )  Vision: Visual Aid: None  Hearing Aid:    Jewelry: Jewelry: Ring (given to audi)  Clothing: Clothing: Other (comment) (street clothes to locker)  Other Valuables:  Other Valuables: None  Valuables sent to safe:      PATIENT INSTRUCTIONS:    After general anesthesia or intravenous sedation, for 24 hours or while taking prescription Narcotics:  · Limit your activities  · Do not drive and operate hazardous machinery  · Do not make important personal or business decisions  · Do  not drink alcoholic beverages  · If you have not urinated within 8 hours after discharge, please contact your surgeon on call. Report the following to your surgeon:  · Excessive pain, swelling, redness or odor of or around the surgical area  · Temperature over 100.5  · Nausea and vomiting lasting longer than 4 hours or if unable to take medications  · Any signs of decreased circulation or nerve impairment to extremity: change in color, persistent  numbness, tingling, coldness or increase pain  · Any questions    COUGH AND DEEP BREATHE    Breathing deep and coughing are very important exercises to do after surgery. Deep breathing and coughing open the little air tubes and air sacks in your lungs. You take deep breaths every day. You may not even notice - it is just something you do when you sigh or yawn. It is a natural exercise you do to keep these air passages open. After surgery, take deep breaths and cough, on purpose. Coughing and deep breathing help prevent bronchitis and pneumonia after surgery. If you had chest or belly surgery, use a pillow as a \"hug buddy\" and hold it tightly to your chest or belly when you cough. DIRECTIONS:  6. Take 10 to 15 slow deep breaths every hour while awake. 7. Breathe in deeply, and hold it for 2 seconds. 8. Exhale slowly through puckered lips, like blowing up a balloon. 9. After every 4th or 5th deep breath, hug your pillow to your chest or belly and give a hard, deep cough. Yes, it will probably hurt. But doing this exercise is very important part of healing after surgery. Take your pain medicine to help you do this exercise without too much pain. IF YOU HAVE BEEN DIAGNOSED WITH SLEEP APNEA, PLEASE USE YOUR SLEEP APNEA DEVICE OR CPAP MACHINE WHEN YOU INTEND TO NAP AFTER TAKING PAIN MEDICATION. Ankle Pumps    Ankle pumps increase the circulation of oxygenated blood to your lower extremities and decrease your risk for circulation problems such as blood clots.  They also stretch the muscles, tendons and ligaments in your foot and ankle, and prevent joint contracture in the ankle and foot, especially after surgeries on the legs. It is important to do ankle pump exercises regularly after surgery because immobility increases your risk for developing a blood clot. Your doctor may also have you take an Aspirin for the next few days as well. If your doctor did not ask you to take an Aspirin, consult with him before starting Aspirin therapy on your own. Slowly point your foot forward, feeling the muscles on the top of your lower leg stretch, and hold this position for 5 seconds. Next, pull your foot back toward you as far as possible, stretching the calf muscles, and hold that position for 5 seconds. Repeat with the other foot. Perform 10 repetitions every hour while awake for both ankles if possible (down and then up with the foot once is one repetition). You should feel gentle stretching of the muscles in your lower leg when doing this exercise. If you feel pain, or your range of motion is limited, don't  Push too hard. Only go the limit your joint and muscles will let you go. If you have increasing pain, progressively worsening leg warmth or swelling, STOP the exercise and call your doctor. Below is information about the medications your doctor is prescribing after your visit:          These are general instructions for a healthy lifestyle:    *  Please give a list of your current medications to your Primary Care Provider. *  Please update this list whenever your medications are discontinued, doses are      changed, or new medications (including over-the-counter products) are added. *  Please carry medication information at all times in case of emergency situations.     About Smoking  No smoking / No tobacco products / Avoid exposure to second hand smoke    Surgeon General's Warning:  Quitting smoking now greatly reduces serious risk to your health. Obesity, smoking, and sedentary lifestyle greatly increases your risk for illness and disease. A healthy diet, regular physical exercise & weight monitoring are important for maintaining a healthy lifestyle. Congestive Heart Failure  You may be retaining fluid if you have a history of heart failure or if you experience any of the following symptoms:  Weight gain of 3 pounds or more overnight or 5 pounds in a week, increased swelling in our hands or feet or shortness of breath while lying flat in bed. Please call your doctor as soon as you notice any of these symptoms; do not wait until your next office visit. Recognize signs and symptoms of STROKE:  F - face looks uneven  A - arms unable to move or move even  S - speech slurred or non-existent  T - time-call 911 as soon as signs and symptoms begin-DO NOT go         Back to bed or wait to see if you get better-TIME IS BRAIN. Warning signs of HEART ATTACK  Call 911 if you have these symptoms    · Chest discomfort. Most heart attacks involve discomfort in the center of the chest that lasts more than a few minutes, or that goes away and comes back. It can feel like uncomfortable pressure, squeezing, fullness, or pain. · Discomfort in other areas of the upper body. Symptoms can include pain or discomfort in one or both        Arms, the back, neck, jaw, or stomach. ·  Shortness of breath with or without chest discomfort. · Other signs may include breaking out in a cold sweat, nausea, or lightheadedness    Don't wait more than five minutes to call 911 - MINUTES MATTER! Fast action can save your life. Calling 911 is almost always the fastest way to get lifesaving treatment. Emergency Medical Services staff can begin treatment when they arrive - up to an hour sooner than if someone gets to the hospital by car.

## 2018-10-15 NOTE — ANESTHESIA POSTPROCEDURE EVALUATION
Post-Anesthesia Evaluation and Assessment Patient: Montez Kuo MRN: [id number]  SSN: [id number] YOB: 1990  Age: 33 y.o. Sex: female Cardiovascular Function/Vital Signs Visit Vitals  /64  Pulse 65  Temp 36.4 °C (97.6 °F)  Resp 13  Ht 5' 3\" (1.6 m)  Wt 64.3 kg (141 lb 12.1 oz)  SpO2 96%  BMI 25.11 kg/m2 Patient is status post general anesthesia for Procedure(s): 
SUCTION DILATATION AND CURETTAGE . Nausea/Vomiting: None Postoperative hydration reviewed and adequate. Pain: 
Pain Scale 1: Numeric (0 - 10) (10/15/18 1341) Pain Intensity 1: 8 (10/15/18 1341) Managed Neurological Status:  
Neuro (WDL): Exceptions to WDL (10/15/18 1316) Neuro Neurologic State: Drowsy; Eyes open spontaneously (10/15/18 1316) LUE Motor Response: Purposeful;Spontaneous  (10/15/18 1316) LLE Motor Response: Purposeful;Spontaneous  (10/15/18 1316) RUE Motor Response: Purposeful;Spontaneous  (10/15/18 1316) RLE Motor Response: Purposeful;Spontaneous  (10/15/18 1316) At baseline Mental Status and Level of Consciousness: Arousable Pulmonary Status:  
O2 Device: Room air (10/15/18 1356) Adequate oxygenation and airway patent Complications related to anesthesia: None Post-anesthesia assessment completed. No concerns Signed By: Karyn Ceron MD   
 October 15, 2018

## 2018-10-15 NOTE — ANESTHESIA PREPROCEDURE EVALUATION
Anesthetic History No history of anesthetic complications Review of Systems / Medical History Patient summary reviewed, nursing notes reviewed and pertinent labs reviewed Pulmonary Smoker (1/2 ppd) Comments: Chronic bronchitis Neuro/Psych Psychiatric history (panic attacks, anxiety) Cardiovascular Hyperlipidemia Exercise tolerance: >4 METS 
  
GI/Hepatic/Renal 
  
GERD (no symptoms currently): well controlled Endo/Other Anemia Other Findings Comments: Currently nauseated 8weeks gestational age Physical Exam 
 
Airway Mallampati: I 
TM Distance: > 6 cm Neck ROM: normal range of motion Mouth opening: Normal 
 
 Cardiovascular Regular rate and rhythm,  S1 and S2 normal,  no murmur, click, rub, or gallop Rhythm: regular Rate: normal 
 
 
 
 Dental 
 
Dentition: Poor dentition Pulmonary Breath sounds clear to auscultation Abdominal 
GI exam deferred Other Findings Anesthetic Plan ASA: 2 Anesthesia type: general 
 
 
 
 
Induction: Intravenous Anesthetic plan and risks discussed with: Patient

## 2018-10-15 NOTE — IP AVS SNAPSHOT
[facility] 
 
 
 [address] [facility] 
[phone] Patient: Anthony Peters MRN: [id number] :1990 About your hospitalization You were admitted on:  October 15, 2018 You last received care in the:  1FM SURGERY You were discharged on:  October 15, 2018 Why you were hospitalized Your primary diagnosis was:  Not on File Follow-up Information Follow up With Details Comments Contact Info Carrington Munoz MD   [address] [id number] [phone] In 2 weeks Discharge Orders None A check yan indicates which time of day the medication should be taken. My Medications CONTINUE taking these medications Instructions Each Dose to Equal  
 Morning Noon Evening Bedtime  
 atorvastatin 40 mg tablet Commonly known as:  LIPITOR Your last dose was: Your next dose is: Take 1 Tab by mouth daily. 40 mg  
    
   
   
   
  
 diphenhydrAMINE 25 mg tablet Commonly known as:  BENADRYL Your last dose was: Your next dose is: Take 1 Tab by mouth nightly as needed for Sleep. 25 mg  
    
   
   
   
  
 ibuprofen 600 mg tablet Commonly known as:  MOTRIN Your last dose was: Your next dose is: Take 1 Tab by mouth every eight (8) hours as needed for Pain. 600 mg  
    
   
   
   
  
 ondansetron 8 mg disintegrating tablet Commonly known as:  ZOFRAN ODT Your last dose was: Your next dose is: Take 1 Tab by mouth every eight (8) hours as needed for Nausea. 8 mg PNV62-FA-om3-dha-epa-fish oil 400 mcg-35 mg -25 mg-5 mg Ned Stain Commonly known as:  PRENATAL GUMMY Your last dose was: Your next dose is: Take 1 Tab by mouth daily. 1 Tab Discharge Instructions Dilation and Curettage (D&C): What to Expect at [facility] Your Recovery Dilation and curettage (D&C) is a procedure to remove tissue from the inside of the uterus. The doctor used instruments to gently scrape tissue from your uterus. You may have a backache, or cramps similar to menstrual cramps, and pass small clots of blood from your vagina for the first few days. You may continue to have light vaginal bleeding for several weeks after the procedure. You will probably be able to go back to most of your normal activities in 1 or 2 days. This care sheet gives you a general idea about how long it will take for you to recover. But each person recovers at a different pace. Follow the steps below to get better as quickly as possible. How can you care for yourself at home? Activity · Rest when you feel tired. Getting enough sleep will help you recover. · You may resume strenuous activities, such as bicycle riding, jogging, weight lifting, or aerobic exercise, when you feel able to do so. · Most women are able to return to work the day after the procedure. · You may have some light vaginal bleeding. Wear sanitary pads if needed. Do not douche or use tampons for 2 weeks or until your doctor says it is okay. · Ask your doctor when it is okay for you to have sex--usually after bleeding has stopped. · Use condoms to prevent pregnancy if necessary. Diet · You can eat your normal diet. If your stomach is upset, try bland, low-fat foods like plain rice, broiled chicken, toast, and yogurt. · Drink fluids as you would normally. Medicines · Take pain medicines exactly as directed. ¨ If the doctor gave you a prescription medicine for pain, take it as prescribed. ¨ If you are not taking a prescription pain medicine, take Aleve or Advil as needed unless you are allergic. · If you think your pain medicine is making you sick to your stomach: 
¨ Take your medicine with food. ¨ Ask your doctor for a different pain medicine. · If your doctor prescribed antibiotics, take them as directed. Do not stop taking them just because you feel better. Follow-up appointment is usually 2 or 3 weeks after surgery. Be sure to make an appointment if you don't have one already, and call your doctor if you are having problems. It's also a good idea to know your test results and keep a list of the medicines you take. When should you call for help? Call 911 anytime you think you may need emergency care. For example, call if: 
· You passed out (lost consciousness). · You have severe trouble breathing. · You have chest pain and shortness of breath, or you cough up blood. · You have severe pain in your belly. Call your doctor now or seek immediate medical care if: 
· You have bright red vaginal bleeding that soaks one or more pads each hour for 2 or more hours. · You pass blood clots that are larger than a golf ball. · You have vaginal discharge that smells bad. · You are sick to your stomach or cannot keep fluids down. · You have pain that does not get better after you take pain medicine. · You have pain that is getting worse 2 days after the procedure. · You have a fever over 100°F. 
· Your belly feels tender, or full and hard. DISCHARGE SUMMARY from your Nurse The following personal items collected during your admission are returned to you:  
Dental Appliance: Dental Appliances:  (poor dentition ) Vision: Visual Aid: None Hearing Aid:   
Jewelry: Jewelry: Ring (given to audi) Clothing: Clothing: Other (comment) (street clothes to locker) Other Valuables: Other Valuables: None Valuables sent to safe:   
 
PATIENT INSTRUCTIONS: 
 
After general anesthesia or intravenous sedation, for 24 hours or while taking prescription Narcotics: · Limit your activities · Do not drive and operate hazardous machinery · Do not make important personal or business decisions · Do  not drink alcoholic beverages · If you have not urinated within 8 hours after discharge, please contact your surgeon on call. Report the following to your surgeon: 
· Excessive pain, swelling, redness or odor of or around the surgical area · Temperature over 100.5 · Nausea and vomiting lasting longer than 4 hours or if unable to take medications · Any signs of decreased circulation or nerve impairment to extremity: change in color, persistent  numbness, tingling, coldness or increase pain · Any questions [address] Breathing deep and coughing are very important exercises to do after surgery. Deep breathing and coughing open the little air tubes and air sacks in your lungs. You take deep breaths every day. You may not even notice - it is just something you do when you sigh or yawn. It is a natural exercise you do to keep these air passages open. After surgery, take deep breaths and cough, on purpose. Coughing and deep breathing help prevent bronchitis and pneumonia after surgery. If you had chest or belly surgery, use a pillow as a \"hug buddy\" and hold it tightly to your chest or belly when you cough. DIRECTIONS: 
6. Take 10 to 15 slow deep breaths every hour while awake. 7. Breathe in deeply, and hold it for 2 seconds. 8. Exhale slowly through puckered lips, like blowing up a balloon. 9. After every 4th or 5th deep breath, hug your pillow to your chest or belly and give a hard, deep cough. Yes, it will probably hurt. But doing this exercise is very important part of healing after surgery. Take your pain medicine to help you do this exercise without too much pain. IF YOU HAVE BEEN DIAGNOSED WITH SLEEP APNEA, PLEASE USE YOUR SLEEP APNEA DEVICE OR CPAP MACHINE WHEN YOU INTEND TO NAP AFTER TAKING PAIN MEDICATION. Ankle Pumps Ankle pumps increase the circulation of oxygenated blood to your lower extremities and decrease your risk for circulation problems such as blood clots. They also stretch the muscles, tendons and ligaments in your foot and ankle, and prevent joint contracture in the ankle and foot, especially after surgeries on the legs. It is important to do ankle pump exercises regularly after surgery because immobility increases your risk for developing a blood clot. Your doctor may also have you take an Aspirin for the next few days as well. If your doctor did not ask you to take an Aspirin, consult with him before starting Aspirin therapy on your own. Slowly point your foot forward, feeling the muscles on the top of your lower leg stretch, and hold this position for 5 seconds. Next, pull your foot back toward you as far as possible, stretching the calf muscles, and hold that position for 5 seconds. Repeat with the other foot. Perform 10 repetitions every hour while awake for both ankles if possible (down and then up with the foot once is one repetition). You should feel gentle stretching of the muscles in your lower leg when doing this exercise. If you feel pain, or your range of motion is limited, don't  Push too hard. Only go the limit your joint and muscles will let you go. If you have increasing pain, progressively worsening leg warmth or swelling, STOP the exercise and call your doctor. Below is information about the medications your doctor is prescribing after your visit: 
 
Other information in your discharge envelope: 
[]     PRESCRIPTIONS []     PHYSICAL THERAPY PRESCRIPTION 
[]     APPOINTMENT CARDS []     Regional Anesthesia Pamphlet for block or block with On-Q Catheter from Anesthesia Service []     Medical device information sheets/pamphlets from their   
[]     School/work excuse note. []     /parent work excuse note. These are general instructions for a healthy lifestyle: *  Please give a list of your current medications to your Primary Care Provider. *  Please update this list whenever your medications are discontinued, doses are 
    changed, or new medications (including over-the-counter products) are added. *  Please carry medication information at all times in case of emergency situations. About Smoking No smoking / No tobacco products / Avoid exposure to second hand smoke Surgeon General's Warning:  Quitting smoking now greatly reduces serious risk to your health. Obesity, smoking, and sedentary lifestyle greatly increases your risk for illness and disease. A healthy diet, regular physical exercise & weight monitoring are important for maintaining a healthy lifestyle. Congestive Heart Failure You may be retaining fluid if you have a history of heart failure or if you experience any of the following symptoms:  Weight gain of 3 pounds or more overnight or 5 pounds in a week, increased swelling in our hands or feet or shortness of breath while lying flat in bed. Please call your doctor as soon as you notice any of these symptoms; do not wait until your next office visit. Recognize signs and symptoms of STROKE: 
F - face looks uneven A - arms unable to move or move even S - speech slurred or non-existent T - time-call 911 as soon as signs and symptoms begin-DO NOT go Back to bed or wait to see if you get better-TIME IS BRAIN. Warning signs of HEART ATTACK Call 911 if you have these symptoms · Chest discomfort. Most heart attacks involve discomfort in the center of the chest that lasts more than a few minutes, or that goes away and comes back. It can feel like uncomfortable pressure, squeezing, fullness, or pain. · Discomfort in other areas of the upper body. Symptoms can include pain or discomfort in one or both Arms, the back, neck, jaw, or stomach. ·  Shortness of breath with or without chest discomfort. · Other signs may include breaking out in a cold sweat, nausea, or lightheadedness Don't wait more than five minutes to call 911 - MINUTES MATTER! Fast action can save your life. Calling 911 is almost always the fastest way to get lifesaving treatment. Emergency Medical Services staff can begin treatment when they arrive - up to an hour sooner than if someone gets to the hospital by car. · Introducing [facility]! Dear Lynette Mayen: 
Thank you for requesting a Peerlyst account. Our records indicate that you already have an active Peerlyst account. You can access your account anytime at [email or web address] Did you know that you can access your hospital and ER discharge instructions at any time in Peerlyst? You can also review all of your test results from your hospital stay or ER visit. Additional Information If you have questions, please visit the Frequently Asked Questions section of the Peerlyst website at [email or web address] Remember, Peerlyst is NOT to be used for urgent needs. For medical emergencies, dial 911. Now available from your iPhone and Android! Introducing Jorge Murcia As a [employer] patient, I wanted to make you aware of our electronic visit tool called Jorge Murcia. [employer] 24/7 allows you to connect within minutes with a medical provider 24 hours a day, seven days a week via a mobile device or tablet or logging into a secure website from your computer. You can access Jorge Murcia from anywhere in the [place]. A virtual visit might be right for you when you have a simple condition and feel like you just dont want to get out of bed, or cant get away from work for an appointment, when your regular [employer] provider is not available (evenings, weekends or holidays), or when youre out of town and need minor care.   Electronic visits cost only $49 and if the [facility] 24/7 provider determines a prescription is needed to treat your condition, one can be electronically transmitted to a nearby pharmacy*. Please take a moment to enroll today if you have not already done so. The enrollment process is free and takes just a few minutes. To enroll, please download the [employer] 24/7 krystle to your tablet or phone, or visit [email or web address] to enroll on your computer. And, as an [address] [place] [address] patient with a SIPphone account, the results of your visits will be scanned into your electronic medical record and your primary care provider will be able to view the scanned results. We urge you to continue to see your regular [facility] provider for your ongoing medical care. And while your primary care provider may not be the one available when you seek a [facility] virtual visit, the peace of mind you get from getting a real diagnosis real time can be priceless. For more information on Trusera, view our Frequently Asked Questions (FAQs) at [email or web address]. Sincerely, 
 
Brenna Rizvi MD 
Chief Medical Officer [facility] Kimberly Maradiaga *:  certain medications cannot be prescribed via [facility] Providers Seen During Your Hospitalization Provider Specialty Primary office phone Patricia Lechuga MD Obstetrics & Gynecology [phone] Your Primary Care Physician (PCP) Primary Care Physician Office Phone Office Fax [address] [facility] [phone] You are allergic to the following Allergen Reactions Tessalon (Benzonatate) Rash Recent Documentation Height Weight BMI OB Status Smoking Status 1.6 m 64.3 kg 25.11 kg/m2 Having regular periods Current Every Day Smoker Emergency Contacts Name Discharge Info Relation Home Work [employer] [place] CAREGIVER [3] Boyfriend [17]   [phone] LópezGustavo eisenberg  Father [15] [phone] Britni Garcia  Parent [1] [phone] Patient Belongings The following personal items are in your possession at time of discharge: 
  Dental Appliances:  (poor dentition )  Visual Aid: None   Hearing Aids/Status: Does not own  Home Medications: None   Jewelry: Ring (given to fiance)  Clothing: Other (comment) (street clothes to locker)    Other Valuables: None Please provide this summary of care documentation to your next provider. Signatures-by signing, you are acknowledging that this After Visit Summary has been reviewed with you and you have received a copy. Patient Signature:  ____________________________________________________________ Date:  ____________________________________________________________  
  
[facility] Provider Signature:  ____________________________________________________________ Date:  ____________________________________________________________

## 2018-10-15 NOTE — H&P
Gynecology History and Physical 
 
Name: Aileen Apodaca MRN: [id number] SSN: [id number] YOB: 1990  Age: 33 y.o. Sex: female Subjective: Chief complaint: non-viable pregnancy Abilio Briceño is a 33 y.o.  female with a history of a non-viable pregnancy in the first trimester. Previous evaluation has been done with ultrasound and pelvic exam, which revealed no fetal heart tones and a significant amount of POC, putting her at risk for hemorrhage at miscarriage. She is now admitted for outpatient surgery consisting of Procedure(s) (LRB): 
SUCTION DILATATION AND CURETTAGE  (N/A). OB History  Para Term  AB Living 6 4 4  2 4 SAB TAB Ectopic Molar Multiple Live Births 2    0 4 Past Medical History:  
Diagnosis Date  Abnormal Pap smear ASCUS + HPV 16  
 ASCUS with positive high risk human papillomavirus of vagina 2017  Hypercholesteremia 2010  Nausea and vomiting in pregnancy 10/3/2014  Psychiatric disorder   
 anxiety  Supervision of normal pregnancy 10/3/2014  Upper abdominal pain 2015 Past Surgical History:  
Procedure Laterality Date  HX COLPOSCOPY  05/10/2017  
 benign cells  HX TONSIL AND ADENOIDECTOMY Social History Occupational History  Not on file. Social History Main Topics  Smoking status: Current Every Day Smoker Packs/day: 0.50 Years: 6.00 Types: Cigarettes  Smokeless tobacco: Never Used  Alcohol use No  
 Drug use: No  
 Sexual activity: Yes  
  Partners: Male Birth control/ protection: None History reviewed. No pertinent family history. Allergies Allergen Reactions  Tessalon [Benzonatate] Rash Prior to Admission medications Medication Sig Start Date End Date Taking? Authorizing Provider PNV62-FA-om3-dha-epa-fish oil (PRENATAL GUMMY) 400 mcg-35 mg -25 mg-5 mg chew Take 1 Tab by mouth daily.  18   Mora El MD  
 diphenhydrAMINE (BENADRYL) 25 mg tablet Take 1 Tab by mouth nightly as needed for Sleep. 9/25/18   Juliann Thurston MD  
ondansetron (ZOFRAN ODT) 8 mg disintegrating tablet Take 1 Tab by mouth every eight (8) hours as needed for Nausea. 9/14/18   Juliann Thurston MD  
XXU08-tfbf carb,glu-FA-dss-dha (CITRANATAL 90 DHA, ALGAL OIL,) 90 mg iron-1 mg -50 mg-300 mg cmpk Take 1 Tab by mouth daily. 8/29/18   Juliann Thurston MD  
ibuprofen (MOTRIN) 600 mg tablet Take 1 Tab by mouth every eight (8) hours as needed for Pain. 7/19/18   Milla Basurto MD  
atorvastatin (LIPITOR) 40 mg tablet Take 1 Tab by mouth daily. 7/19/18   Milla Basurto MD  
SUMAtriptan (IMITREX) 100 mg tablet Take 1 Tab by mouth once as needed for Migraine for up to 1 dose. Repeat 2 hours if not gone, no more than 2 pills in 24 hours 4/18/18   Steffi Parson NP  
propranolol (INDERAL) 10 mg tablet Take 1 Tab by mouth three (3) times daily as needed. 4/18/18   Steffi Parson NP  
[address] [address], 0.25-35 mg-mcg tab TAKE 1 TAB BY MOUTH DAILY. INDICATIONS: DYSMENORRHEA 12/18/17   Austyn Little MD  
mometasone (NASONEX) 50 mcg/actuation nasal spray 2 Sprays by Both Nostrils route daily. 2/28/17   Jazzy Nunn NP  
LORazepam (ATIVAN) 0.5 mg tablet Take 1 Tab by mouth every eight (8) hours as needed for Anxiety. Max Daily Amount: 1.5 mg. Use sparingly and for extreme anxiety. Must last 30 days. 12/5/16   Steffi Parson NP Review of Systems: 
History obtained from the patient-negative for: 
Constitutional: weight loss, fever, night sweats HEENT: hearing loss, earache, congestion, snoring, sorethroat CV: chest pain, palpitations, edema Resp: cough, shortness of breath, wheezing Breast: breast lumps, nipple discharge, galactorrhea GI: change in bowel habits, abdominal pain, black or bloody stools : frequency, dysuria, hematuria, vaginal discharge MSK: back pain, joint pain, muscle pain Skin: itching, rash, hives Neuro: dizziness, headache, confusion, weakness Psych: anxiety, depression, change in mood Heme/lymph: bleeding, bruising, pallor Objective: There were no vitals filed for this visit. PHYSICAL EXAMINATION Constitutional 
· Appearance: well-nourished, well developed, alert, in no acute distress HENT 
· Head and Face: appears normal 
 
Neck · Inspection/Palpation: normal appearance, no masses or tenderness · Lymph Nodes: no lymphadenopathy present · Thyroid: gland size normal, nontender, no nodules or masses present on palpation Chest 
· Respiratory Effort: breathing labored · Auscultation: normal breath sounds Cardiovascular · Heart: 
· Auscultation: regular rate and rhythm without murmur Breasts · Inspection of Breasts: breasts symmetrical, no skin changes, no discharge present, nipple appearance normal, no skin retraction present · Palpation of Breasts and Axillae: no masses present on palpation, no breast tenderness · Axillary Lymph Nodes: no lymphadenopathy present Gastrointestinal 
· Abdominal Examination: abdomen non-tender to palpation, normal bowel sounds, no masses present · Liver and spleen: no hepatomegaly present, spleen not palpable · Hernias: no hernias identified Genitourinary · External Genitalia: normal appearance for age, no discharge present, no tenderness present, no inflammatory lesions present, no masses present, no atrophy present · Vagina: normal vaginal vault with no discharge present, no inflammatory lesions present, no masses present · Bladder: non-tender to palpation · Urethra: appears normal 
· Cervix: normal  
· Uterus: enlarged, soft, consistent with pregnancy · Adnexa: no adnexal tenderness present, no adnexal masses present · Perineum: perineum within normal limits, no evidence of trauma, no rashes or skin lesions present · Anus: anus within normal limits, no hemorrhoids present · Inguinal Lymph Nodes: no lymphadenopathy present Skin 
· General Inspection: no rash, no lesions identified Neurologic/Psychiatric · Mental Status: · Orientation: grossly oriented to person, place and time · Mood and Affect: mood normal, affect appropriate Assessment:  
Non-viable first trimester pregnancy with significant POC. Plan:  
 
Procedure(s) (LRB): 
SUCTION DILATATION AND CURETTAGE  (N/A) Discussed the risks of surgery including the risks of bleeding, infection, deep vein thrombosis, and surgical injuries to internal organs including but not limited to the bowels, bladder, rectum, and female reproductive organs. The patient understands the risks; any and all questions were answered to the patient's satisfaction. Signed By:  Mora El MD   
 October 15, 2018

## 2018-10-15 NOTE — OP NOTES
[address]    NAME: Sherri Tracy    AGE: 33 y.o. YOB: 1990    MEDICAL RECORD NUMBER: [id number]    DATE OF SURGERY: 10/15/2018    PREOPERATIVE DIAGNOSIS: MISSED      POSTOPERATIVE DIAGNOSIS: MISSED     PROCEDURE: Procedure(s):  SUCTION DILATATION AND CURETTAGE      SURGEON:  Kirsten Andrade MD    ASSISTANT: staff    ANESTHESIA:general    EBL: less than 100 ml    SPECIMENS: Products of conception    FINDINGS: Products of conception    DESCRIPTION OF PROCEDURE: The patient was placed on the operating room table in the dorsal lithotomy position after the induction of general endotracheal anesthesia. Time out was done to confirm the operating procedure, surgeon, patient and site. Once confirmed by the team, procedure was started. PROCEDURE: She was prepped and draped. Cervix was exposed with a weighted vaginal speculum and grasped with a two Allis clamps. The uterine cavity sounded to 9 cms. A curved 9 suction curette was then introduced into the endometrial cavity. Thorough suction curettage was followed by sharp curettage with a large curette. Suction curettage was repeated until the suction returned no further clot or products of conception. Adequate curettage was felt to be obtained. The products of conception were removed and sent to pathology. There was no sign of perforation. The uterus was massaged and hemostasis was adequate. Instruments were removed. The patient went to the recovery room in satisfactory condition. All counts were correct times two. Patient's blood type is documented in lab results.     Signed By:  Kirsten Andrade MD     October 15, 2018

## 2018-10-15 NOTE — PERIOP NOTES
Dr. Florian Peabody notified of pt having known atibodies in blood and the need for it to be sent to [facility] for identification and it may take up to 2 or 3 hours to complete the type and screen. Dr. Florian Peabody is Robi Hilliard to proceed with surgery as scheduled prior to the completion of the type and screen.

## 2018-10-16 ENCOUNTER — TELEPHONE (OUTPATIENT)
Dept: OBGYN CLINIC | Age: 28
End: 2018-10-16

## 2018-10-16 NOTE — TELEPHONE ENCOUNTER
Dr. Randal Rodriguez patient:    Patient calling stating that she had a suction D&C yesterday for a missed ab and was advised that once the bleeding stops she can resume sexual intercourse. Patient reports that today the bleeding has stopped but still sore in her uterus. Please advise when she can resume sexual intercourse.

## 2018-10-17 ENCOUNTER — TELEPHONE (OUTPATIENT)
Dept: OBGYN CLINIC | Age: 28
End: 2018-10-17

## 2018-10-17 NOTE — TELEPHONE ENCOUNTER
Patient of Dr. Shahida Syed, had a suction D&C on 10/15/18. She called in the office to report yesterday per patient that she had stopped bleeding vaginally. She lifted her son and was very active, more than she was supposed to be yesterday. Today she is having bleeding that started this morning without clotting. She said that she is not soaking pads she just had a little blood streaking on them. She is noticing bright red blood. Pain in uterus area and lower back in the middle. How long do you expect for the discharge to continue and pain?

## 2018-10-17 NOTE — TELEPHONE ENCOUNTER
We discussed this at length already. Highly variable, may be more or less, occasional clot. Last up to 10 days. May stop and start.

## 2018-10-17 NOTE — TELEPHONE ENCOUNTER
Discussed with patient. Discussed that she can use Ibuprofen 800 mg q8hrs prn, or Naproxen 2 tabs q12 hours. Can use Tylenol for breakthrough pain if not allergic to any of the meds. Discussed bleeding can occur for up to 10 days on and off. Patient verbalized understanding.

## 2018-10-18 ENCOUNTER — TELEPHONE (OUTPATIENT)
Dept: OBGYN CLINIC | Age: 28
End: 2018-10-18

## 2018-10-18 NOTE — TELEPHONE ENCOUNTER
Patient had a suction D&C 10/15/18      Patient called yesterday to say that she was having a lot of abdominal discomfort. She is calling today to say that she is having lower abdominal pain and lower back pain that is a a 7/10. She has tried Naproxen, Ibuprofen, and Tylenol. She is saying that the otc products are not touching the pain. Her bleeding has improved and slowed down. She has been having old looking dark blood. She is also having bad problems with constipation. She felt that she was going to have a BM yesterday but never did. She took one Dulcolax without success yesterday. Still feels pressure like she needs to go. She has not been using stool softeners. What would you like for her to take for the constipation, I am figuring she did not take enough of the Dulcolax yesterday to do any good?

## 2018-10-18 NOTE — TELEPHONE ENCOUNTER
Call received at 534am    33year old patient calling again. Patient had surgery PROCEDURE: Procedure(s):  SUCTION DILATATION AND CURETTAGE on 10/15/18. Patient calling to ask if she can give her self and enema. Patient states she is not eating breakfast and lunch right now. Patient states she is not eating fruits and vegetables. Patient is not taking colace for stool softener.       Please advised regarding enema

## 2018-10-18 NOTE — TELEPHONE ENCOUNTER
Patient has been advised that [address] [place] [address] Dr Amador is in the OR this am and will not be available in the office until pm to answer message. Patient is okay with this.

## 2018-10-18 NOTE — TELEPHONE ENCOUNTER
Patient aware of MD recommendations. Patient advised that she can use the enema if need be and Dr. Brayden Gould did not prescribe any medications.

## 2018-10-24 ENCOUNTER — OFFICE VISIT (OUTPATIENT)
Dept: OBGYN CLINIC | Age: 28
End: 2018-10-24

## 2018-10-24 VITALS — SYSTOLIC BLOOD PRESSURE: 136 MMHG | DIASTOLIC BLOOD PRESSURE: 72 MMHG | BODY MASS INDEX: 24.91 KG/M2 | WEIGHT: 140.6 LBS

## 2018-10-24 DIAGNOSIS — Z09 POSTOP CHECK: Primary | ICD-10-CM

## 2018-10-24 NOTE — PROGRESS NOTES
Postop Evaluation  Rina Choe is a 33 y.o. female returns for a routine post-operative follow-up visit after undergoing the following: suction D&C for missed ab which was done 2 weeks ago. Her pathology results revealed POC. Since the patient's surgery, she has had typical postoperative cramping and spotting but no significant symptoms or problems since the surgery. The patient is healing well with no significant drainage. She states since the procedure, she has returned to full daily activities, ambulating, and not lifting or exercising. PHYSICAL EXAMINATION  Visit Vitals  /72   Wt 140 lb 9.6 oz (63.8 kg)   BMI 24.91 kg/m²         Gastrointestinal  · Abdominal Examination: abdomen non-tender to palpation, incision/s healing well, normal bowel sounds, no masses present  · Liver and spleen: no hepatomegaly present, spleen not palpable  · Hernias: no hernias identified    Genitourinary  · External Genitalia: normal appearance for age, no discharge present, no tenderness present, no inflammatory lesions present, no masses present, no atrophy present  · Vagina: normal vaginal vault without central or paravaginal defects, no discharge present, no inflammatory lesions present, no masses present  · Bladder: non-tender to palpation  · Urethra: appears normal  · Cervix: normal   · Uterus: normal size, shape and consistency  · Adnexa: no adnexal tenderness present, no adnexal masses present  · Perineum: perineum within normal limits, no evidence of trauma, no rashes or skin lesions present  Skin  · General Inspection: no rash, no lesions identified    Neurologic/Psychiatric  · Mental Status:  · Orientation: grossly oriented to person, place and time  · Mood and Affect: mood normal, affect appropriate    Assessment:  Normal postop checkup    Plan:  RTO as scheduled for AE.

## 2018-11-07 ENCOUNTER — TELEPHONE (OUTPATIENT)
Dept: OBGYN CLINIC | Age: 28
End: 2018-11-07

## 2018-11-07 DIAGNOSIS — Z3A.01 LESS THAN 8 WEEKS GESTATION OF PREGNANCY: Primary | ICD-10-CM

## 2018-11-07 NOTE — TELEPHONE ENCOUNTER
Pt called concerned. She states she was told by dr Shaun Tavares that her period should return on 10/28, (pt had D&C on 10/15). She still has not started her menses and is now having a whitish discharge that has no odor or itching associated with it. Reports that it is exactly the same type of discharge she has had with all of her pregnancy. She says she is \"freaking out and worried and wants to know what to do or what's going on. \"

## 2018-11-07 NOTE — TELEPHONE ENCOUNTER
Called and spoke with pt and let her know dr Abby Olmedo recommendations and plan. She verbalized understanding.

## 2018-11-07 NOTE — TELEPHONE ENCOUNTER
She can:  Do home UPT or come in office. Assuming neg UPT, we can give her Provera to get her period started. Or she can wait for a couple more weeks and should start on her own eventually.

## 2018-11-09 NOTE — TELEPHONE ENCOUNTER
There are two probabilities:  One: this is a new pregnancy--in that case the levels will be rising. Two: this is leftover hormones from the last pregnancy. Either way, the way to tell is to measure levels a couple days apart. She can come in Monday and Wednesday next week.

## 2018-11-09 NOTE — TELEPHONE ENCOUNTER
Patient advised of MD recommendations and orders placed for beta hcg on Monday 11/12 and Wednesday 11/14. Orders placed as pre MD order. Patient is currently in [facility]. This nurse faxed the lab orders to [facility] in [facility] [phone]      Fax confirmation received. Patient verbalized understanding.

## 2018-11-09 NOTE — TELEPHONE ENCOUNTER
Call received at 314PM    33year old patient calling to say that she was advised take a urine pregnancy test.     Patient calling back to say that she took a pregnancy test at 12:30pM and then at 1:00PM and they were both a faint positive. Patient wondering how to proceed.   ? Lab only visit    Please advise

## 2018-11-12 ENCOUNTER — TELEPHONE (OUTPATIENT)
Dept: OBGYN CLINIC | Age: 28
End: 2018-11-12

## 2018-11-12 NOTE — TELEPHONE ENCOUNTER
Patient called back to see if we have received any paperwork from [facility] in [place]. She said blood has been drawn, they received orders, waiting on results.

## 2018-11-12 NOTE — TELEPHONE ENCOUNTER
Patient called this morning stating that she is scheduled to have blood work this morning to check beta hcg levels but states she had rough intercourse last night and today she is having pink spotting but denies cramping. Patient advised that she needs to continue with the blood work so we can see how the pregnancy is going and the spotting is more than likely coming from the intercourse last night. Patient advised to observe pelvic rest, increase her cold cold water and make sure once the bleeding stops, if she wants to have intercourse to not be rough about it. Patient encouraged to have her blood work drawn today and Wednesday and once we know more about the levels, she will be notified. Patient verbalized understanding.

## 2018-11-12 NOTE — TELEPHONE ENCOUNTER
Patient called, left VM on [facility] Triage line stating that she went to her [facility] facility and they told her that the [facility] doesn't accept her insurance. 10:20am - LM to contact the insurance company to find out who the blood work needs to go through and get back to our office for further guidance or she may have to come to our office to have the blood work drawn.

## 2018-11-13 ENCOUNTER — TELEPHONE (OUTPATIENT)
Dept: OBGYN CLINIC | Age: 28
End: 2018-11-13

## 2018-11-13 ENCOUNTER — PATIENT MESSAGE (OUTPATIENT)
Dept: OBGYN CLINIC | Age: 28
End: 2018-11-13

## 2018-11-13 DIAGNOSIS — G44.219 EPISODIC TENSION-TYPE HEADACHE, NOT INTRACTABLE: ICD-10-CM

## 2018-11-13 DIAGNOSIS — Z3A.01 LESS THAN 8 WEEKS GESTATION OF PREGNANCY: ICD-10-CM

## 2018-11-13 RX ORDER — IBUPROFEN 600 MG/1
600 TABLET ORAL
Qty: 30 TAB | Refills: 1 | Status: SHIPPED | OUTPATIENT
Start: 2018-11-13 | End: 2019-03-13 | Stop reason: SDUPTHER

## 2018-11-13 NOTE — TELEPHONE ENCOUNTER
We have not received any blood work from yesterday that is scanned in chart or in fax papers. Advised typically it takes 24 hours to get Beta testing back from our lab, I do not know turn around period for that particular lab. She said that she has had some random pinkness spotting, not bleeding or cramping. She said that she associated it with intercourse. Advised the best way to tell in early pregnancy is to have the Beta's drawn twice within 48 hours to see how the hormone levels are to see if appears to be viable.

## 2018-11-13 NOTE — TELEPHONE ENCOUNTER
Patient was calling to see if we received the blood work from [facility] yesterday.

## 2018-11-13 NOTE — TELEPHONE ENCOUNTER
Results are in and per Dr. Oziel Gregg patient is no longer pregnant. Scanning given to Choi Mar to place in media. I spoke with patient and she has been advised.

## 2018-11-13 NOTE — TELEPHONE ENCOUNTER
Patient calling because she was advised earlier today that her HCG level was not indicative of pregnancy. Patient is calling back and left my chart message for generalized physician (forwarded by nurse to Nacho Ortiz). She said that she has dizziness, breast tenderness, light cramping, and pink discharge that is so light she does not see it hardly on toilet paper and not on a pad. Please help advise. She truly thinks this is implantation bleeding and that she is pregnant.

## 2018-11-14 NOTE — TELEPHONE ENCOUNTER
From: John Monroy  Sent: 11/13/2018 2:11 PM EST  Subject: Non-Urgent Medical Question    [id number] Amy Holman I'm sorry to bother yall but i have light bleeding sorta like implantation bleeding it's been going on for two days with very little cramps and it's not a period what would it be

## 2018-11-14 NOTE — TELEPHONE ENCOUNTER
Implantation bleeding is certainly possible. NO symptoms of pregnancy are associated with that because the hormone levels have not had a chance to start rising. So her other symptoms are not related to that.

## 2018-11-14 NOTE — TELEPHONE ENCOUNTER
Please tell her some irregularity in bleeding pattern is normal.  Observe for now. Give it 8-10 weeks to get back to regular cycle.

## 2019-03-13 DIAGNOSIS — G44.219 EPISODIC TENSION-TYPE HEADACHE, NOT INTRACTABLE: ICD-10-CM

## 2019-03-13 RX ORDER — ATORVASTATIN CALCIUM 40 MG/1
40 TABLET, FILM COATED ORAL DAILY
Qty: 90 TAB | Refills: 1 | Status: SHIPPED | OUTPATIENT
Start: 2019-03-13

## 2019-03-13 RX ORDER — IBUPROFEN 600 MG/1
600 TABLET ORAL
Qty: 30 TAB | Refills: 1 | Status: SHIPPED | OUTPATIENT
Start: 2019-03-13

## 2019-03-21 ENCOUNTER — OFFICE VISIT (OUTPATIENT)
Dept: FAMILY MEDICINE CLINIC | Age: 29
End: 2019-03-21

## 2019-03-21 VITALS
RESPIRATION RATE: 18 BRPM | OXYGEN SATURATION: 98 % | HEART RATE: 99 BPM | BODY MASS INDEX: 26.58 KG/M2 | SYSTOLIC BLOOD PRESSURE: 124 MMHG | DIASTOLIC BLOOD PRESSURE: 82 MMHG | HEIGHT: 63 IN | TEMPERATURE: 99.1 F | WEIGHT: 150 LBS

## 2019-03-21 DIAGNOSIS — N92.6 MISSED PERIOD: Primary | ICD-10-CM

## 2019-03-21 DIAGNOSIS — F33.1 MODERATE EPISODE OF RECURRENT MAJOR DEPRESSIVE DISORDER (HCC): ICD-10-CM

## 2019-03-21 LAB
HCG URINE, QL. (POC): NEGATIVE
VALID INTERNAL CONTROL?: YES

## 2019-03-21 RX ORDER — SERTRALINE HYDROCHLORIDE 50 MG/1
50 TABLET, FILM COATED ORAL DAILY
Qty: 30 TAB | Refills: 5 | Status: SHIPPED | OUTPATIENT
Start: 2019-03-21 | End: 2021-08-25 | Stop reason: ALTCHOICE

## 2019-03-21 NOTE — PROGRESS NOTES
Patient here for insomnia and depression. Also continuous nausea. Patient states she is 1 week late getting her menstrual cycle. She states she had a miscarriage last month. ( patient states this is her third miscarriage, causing her depression)     1. Have you been to the ER, urgent care clinic since your last visit? Hospitalized since your last visit? No    2. Have you seen or consulted any other health care providers outside of the [address] [place] [address] since your last visit? Include any pap smears or colon screening. No     Results for orders placed or performed in visit on 03/21/19   AMB POC URINE PREGNANCY TEST, VISUAL COLOR COMPARISON   Result Value Ref Range    VALID INTERNAL CONTROL POC Yes     HCG urine, Ql. (POC) Negative Negative       Chief Complaint   Patient presents with    Insomnia    Depression     She is a 33 y.o. female who presents for evalution. Reviewed PmHx, RxHx, FmHx, SocHx, AllgHx and updated and dated in the chart. Patient Active Problem List    Diagnosis    Smoker    Upper abdominal pain    Migraine headache    ADD (attention deficit disorder)    Panic anxiety syndrome    Depression    Elevated LFTs    Seasonal allergic reaction    Hypercholesteremia       Review of Systems - negative except as listed above in the HPI    Objective:     Vitals:    03/21/19 1357   BP: 124/82   Pulse: 99   Resp: 18   Temp: 99.1 °F (37.3 °C)   SpO2: 98%   Weight: 150 lb (68 kg)   Height: 5' 3\" (1.6 m)     Physical Examination: General appearance - alert, well appearing, and in no distress  Neck - supple, no significant adenopathy  Chest - clear to auscultation, no wheezes, rales or rhonchi, symmetric air entry  Heart - normal rate, regular rhythm, normal S1, S2, no murmurs, rubs, clicks or gallops    Assessment/ Plan:   Diagnoses and all orders for this visit:    1. Missed period  -     AMB POC URINE PREGNANCY TEST, VISUAL COLOR COMPARISON  -neg    2.  Moderate episode of recurrent major depressive disorder (HCC)  -     sertraline (ZOLOFT) 50 mg tablet; Take 1 Tab by mouth daily.  -add rx       Follow-up and Dispositions    · Return if symptoms worsen or fail to improve. I have discussed the diagnosis with the patient and the intended plan as seen in the above orders. The patient understands and agrees with the plan. The patient has received an after-visit summary and questions were answered concerning future plans. Medication Side Effects and Warnings were discussed with patient  Patient Labs were reviewed and or requested:  Patient Past Records were reviewed and or requested    Steffanie White M.D. There are no Patient Instructions on file for this visit.

## 2019-03-29 ENCOUNTER — OFFICE VISIT (OUTPATIENT)
Dept: OBGYN CLINIC | Age: 29
End: 2019-03-29

## 2019-03-29 VITALS
SYSTOLIC BLOOD PRESSURE: 124 MMHG | WEIGHT: 151.8 LBS | DIASTOLIC BLOOD PRESSURE: 82 MMHG | BODY MASS INDEX: 26.9 KG/M2 | HEIGHT: 63 IN

## 2019-03-29 DIAGNOSIS — Z01.419 ENCOUNTER FOR GYNECOLOGICAL EXAMINATION (GENERAL) (ROUTINE) WITHOUT ABNORMAL FINDINGS: ICD-10-CM

## 2019-03-29 DIAGNOSIS — N92.6 MISSED MENSES: Primary | ICD-10-CM

## 2019-03-29 DIAGNOSIS — N96 HISTORY OF RECURRENT SPONTANEOUS ABORTION, NOT CURRENTLY PREGNANT: ICD-10-CM

## 2019-03-29 DIAGNOSIS — N91.2 AMENORRHEA: ICD-10-CM

## 2019-03-29 LAB
HCG URINE, QL. (POC): NEGATIVE
VALID INTERNAL CONTROL?: YES

## 2019-03-29 NOTE — PROGRESS NOTES
Amenorrhea note      Carnell Angelucci is a 33 y.o. female who complains of absence of menses. She had a faint positive pregnancy test this morning. Her current method of family planning is none. The patient is sexually active. She developed this problem approximately 1 month ago. Associated symptoms include none. Alleviating factors: none    Aggravating factors: none      Last Pap smear:was abnormal: ASCUS. Assessment:   Amenorrhea, possibly due to early pregnancy. Plan:   Check quant HCG        Instructions given to pt. Handouts given to pt. Annual exam ages 21-44    Carnell Angelucci is a ,  33 y.o. female [facility] Patient's last menstrual period was 2019. .    She presents for her annual checkup. She is having significant difficulty with recurrent miscarriages. 3 just in the last 6 months. 4 previous normal pregnancies. With regard to the Gardasil vaccine, she has not received it yet. Menstrual status:    Her periods are regular this past month in flow. She is using three to five pads or tampons per day, lasting for 5 days  without spotting. She denies dysmenorrhea. She reports no premenstrual symptoms. Contraception:    The current method of family planning is none. Sexual history:     She  reports that she currently engages in sexual activity and has had partners who are Male. She reports using the following method of birth control/protection: None. .    Medical conditions:    Since her last annual GYN exam about one year ago, she has not the following changes in her health history: none. Pap and Mammogram History:    Her most recent Pap smear was abnormal, obtained 1 year(s) ago.     The patient has never had a mammogram.    Breast Cancer History/Substance Abuse: negative    Past Medical History:   Diagnosis Date    Abnormal Pap smear     ASCUS + HPV 16    ASCUS with positive high risk human papillomavirus of vagina 2017    Hypercholesteremia 4/26/2010    Nausea and vomiting in pregnancy 10/3/2014    Psychiatric disorder     anxiety    Supervision of normal pregnancy 10/3/2014    Upper abdominal pain 1/7/2015     Past Surgical History:   Procedure Laterality Date    HX COLPOSCOPY  05/10/2017    benign cells    HX DILATION AND CURETTAGE  10/15/2018    Missed AB    HX TONSIL AND ADENOIDECTOMY         Current Outpatient Medications   Medication Sig Dispense Refill    sertraline (ZOLOFT) 50 mg tablet Take 1 Tab by mouth daily. 30 Tab 5    atorvastatin (LIPITOR) 40 mg tablet Take 1 Tab by mouth daily. 90 Tab 1    ibuprofen (MOTRIN) 600 mg tablet Take 1 Tab by mouth every eight (8) hours as needed for Pain. 30 Tab 1    [id number]-dha-epa-fish oil (PRENATAL GUMMY) 400 mcg-35 mg -25 mg-5 mg chew Take 1 Tab by mouth daily. 30 Tab 7    diphenhydrAMINE (BENADRYL) 25 mg tablet Take 1 Tab by mouth nightly as needed for Sleep. 90 Tab 0    ondansetron (ZOFRAN ODT) 8 mg disintegrating tablet Take 1 Tab by mouth every eight (8) hours as needed for Nausea. 30 Tab 2     Allergies: Tessalon [benzonatate]     Tobacco History:  reports that she has been smoking cigarettes. She has a 3.00 pack-year smoking history. She has never used smokeless tobacco.  Alcohol Abuse:  reports that she does not drink alcohol. Drug Abuse:  reports that she does not use drugs. Family Medical/Cancer History: History reviewed. No pertinent family history.      Review of Systems - History obtained from the patient  Constitutional: negative for weight loss, fever, night sweats  HEENT: negative for hearing loss, earache, congestion, snoring, sorethroat  CV: negative for chest pain, palpitations, edema  Resp: negative for cough, shortness of breath, wheezing  GI: negative for change in bowel habits, abdominal pain, black or bloody stools  : negative for frequency, dysuria, hematuria, vaginal discharge  MSK: negative for back pain, joint pain, muscle pain  Breast: negative for breast lumps, nipple discharge, galactorrhea  Skin :negative for itching, rash, hives  Neuro: negative for dizziness, headache, confusion, weakness  Psych: negative for anxiety, depression, change in mood  Heme/lymph: negative for bleeding, bruising, pallor    Physical Exam    Visit Vitals  /82   Ht 5' 3\" (1.6 m)   Wt 151 lb 12.8 oz (68.9 kg)   LMP 02/22/2019   BMI 26.89 kg/m²       Constitutional  · Appearance: well-nourished, well developed, alert, in no acute distress    HENT  · Head and Face: appears normal    Neck  · Inspection/Palpation: normal appearance, no masses or tenderness  · Lymph Nodes: no lymphadenopathy present  · Thyroid: gland size normal, nontender, no nodules or masses present on palpation    Chest  · Respiratory Effort: breathing unlabored  · Auscultation: normal breath sounds    Cardiovascular  · Heart:  · Auscultation: regular rate and rhythm without murmur    Breasts  · Inspection of Breasts: breasts symmetrical, no skin changes, no discharge present, nipple appearance normal, no skin retraction present  · Palpation of Breasts and Axillae: no masses present on palpation, no breast tenderness  · Axillary Lymph Nodes: no lymphadenopathy present    Gastrointestinal  · Abdominal Examination: abdomen non-tender to palpation, normal bowel sounds, no masses present  · Liver and spleen: no hepatomegaly present, spleen not palpable  · Hernias: no hernias identified    Genitourinary  · External Genitalia: normal appearance for age, no discharge present, no tenderness present, no inflammatory lesions present, no masses present, no atrophy present  · Vagina: normal vaginal vault without central or paravaginal defects, no discharge present, no inflammatory lesions present, no masses present  · Bladder: non-tender to palpation  · Urethra: appears normal  · Cervix: normal   · Uterus: normal size, shape and consistency  · Adnexa: no adnexal tenderness present, no adnexal masses present  · Perineum: perineum within normal limits, no evidence of trauma, no rashes or skin lesions present  · Anus: anus within normal limits, no hemorrhoids present  · Inguinal Lymph Nodes: no lymphadenopathy present    Skin  · General Inspection: no rash, no lesions identified    Neurologic/Psychiatric  · Mental Status:  · Orientation: grossly oriented to person, place and time  · Mood and Affect: mood normal, affect appropriate    . Assessment:  Routine gynecologic examination  Her current medical status is satisfactory with no evidence of significant gynecologic issues. Recurrent miscarriage not much of an issue with h/o successful pregnancies.     Plan:  Counseled re: diet, exercise, healthy lifestyle  Return for yearly wellness visits  Gardisil counseling provided  Pt counseled regarding co-testing for high risk HPV with pap  Rec screening mammo at either 36 or 41

## 2019-03-31 LAB — HCG INTACT+B SERPL-ACNC: 19 MIU/ML

## 2019-04-01 ENCOUNTER — TELEPHONE (OUTPATIENT)
Dept: OBGYN CLINIC | Age: 29
End: 2019-04-01

## 2019-04-01 NOTE — TELEPHONE ENCOUNTER
I recommend she come in this week for follow up HCG. We can discuss the progesterone when she has her next pregnancy.

## 2019-04-01 NOTE — TELEPHONE ENCOUNTER
I called patient to tell her what her results are and that she needed to have her Beta testing redrawn and she responded \"never mind, I think I miscarried over the weekend\". She said that after she got home from her exam on 3/29/19 she started with cramping that was severe but improved with walking and bleeding. The bleeding started out light brown and contained blood clots. She said that she has bled all weekend and is still bleeding bright red. She is not bleeding heavily but does see some dime size clots on occasion. She said that she has no cramping now. She keeps asking if she can please have Progesterone for her next pregnancy to keep it healthy, as she is a . She would like to know what Dr. Fredo Srinivasan would like her to do now at this point.

## 2019-04-01 NOTE — TELEPHONE ENCOUNTER
Looking for lab testing results. Had a beta level drawn on 3/29/19      Needs signing off and advice to give patient?

## 2019-04-01 NOTE — TELEPHONE ENCOUNTER
1)  Patient will not be able to come in until next week to do HCG, is this ok? 2)  What were your thoughts on the Progesterone?

## 2019-04-01 NOTE — TELEPHONE ENCOUNTER
Patient declined appt for beta due to vehicle arrangement issues. She has been advised regarding the progesterone consult when pregnant.

## 2019-04-01 NOTE — TELEPHONE ENCOUNTER
Level was 19. Needs to repeat sometime this week and we can tell her where we are headed with this pregnancy.

## 2019-04-02 NOTE — TELEPHONE ENCOUNTER
HCG was only 19 on 3/29. If bleeding gets extremely heavy again then go to ER. If continues overnight then f/u with Dr Emerita Sood this week.

## 2019-04-02 NOTE — TELEPHONE ENCOUNTER
Call received at 1:13PM     33year old patient seen in the office on 3./29/19,    Patient reports having a miscarriage and was having  bleeding over the weekend and she was changing her pad every 5-6 hours. . Still bleeding and she is changing her pad every 2-3 hours and about 30 minutes ago passed a golf ball sized clot and is feeling very tired. Patient states she called the rescue squad and they checked her BP and o2 sat and said every thing is ok. Patient states she has not changed her pad since passing the clot. Patient states she does not have cramping. Patient now scheduled for HCG on 4/9/19 at 2:30PM    This nurse advised the patient to increase her po fluids, rest and bleeding precautions.     Please advise

## 2019-04-03 ENCOUNTER — DOCUMENTATION ONLY (OUTPATIENT)
Dept: FAMILY MEDICINE CLINIC | Age: 29
End: 2019-04-03

## 2019-04-05 LAB
CYTOLOGIST CVX/VAG CYTO: ABNORMAL
CYTOLOGY CVX/VAG DOC THIN PREP: ABNORMAL
DX ICD CODE: ABNORMAL
DX ICD CODE: ABNORMAL
HPV I/H RISK 1 DNA CVX QL PROBE+SIG AMP: NEGATIVE
LABCORP, 190119: ABNORMAL
Lab: ABNORMAL
Lab: ABNORMAL
OTHER STN SPEC: ABNORMAL
PATH REPORT.FINAL DX SPEC: ABNORMAL
PATHOLOGIST CVX/VAG CYTO: ABNORMAL
STAT OF ADQ CVX/VAG CYTO-IMP: ABNORMAL

## 2019-04-08 NOTE — PROGRESS NOTES
Patient aware of results and MD recommendations by phone. Patient explained that she needs another colpo and patient states her  Significant other is out of town with the [employer] and she needs him to be in town to watch her son. She knows that he will be back in a few weeks and she states she will call us back to schedule this appt.

## 2019-04-10 ENCOUNTER — TELEPHONE (OUTPATIENT)
Dept: OBGYN CLINIC | Age: 29
End: 2019-04-10

## 2019-04-10 NOTE — TELEPHONE ENCOUNTER
Patient called back advised of recommendations and inquired about missed appt for blood work. Patient states that she is in the process of moving to [facility] and not sure when she can get in to the office. Patient states she will call the office back to schedule this appt.

## 2019-04-10 NOTE — PROGRESS NOTES
Did not realize she had already been notified of the colpo,    I had left a voice message for her to call back. Also called to see when she would like to come back for the beta test she missed yesterday.

## 2019-04-10 NOTE — TELEPHONE ENCOUNTER
Result Notes for BETA HCG, QT     Notes recorded by [employer] on 4/10/2019 at 12:51 PM EDT  Did not realize she had already been notified of the colpo,    I had left a voice message for her to call back. Also called to see when she would like to come back for the beta test she missed yesterday.  ------    Notes recorded by [employer] on 4/10/2019 at 12:50 PM EDT  Left voice message to call back.

## 2019-05-01 ENCOUNTER — TELEPHONE (OUTPATIENT)
Dept: OBGYN CLINIC | Age: 29
End: 2019-05-01

## 2019-05-01 NOTE — TELEPHONE ENCOUNTER
Patient of [address] [place] [address] Dr Amador. She is calling to say that she has had 4 consecutive miscarriages, the last one being in March of this year. She is calling because she lives in [address] [place] [address] and can not always come to see us immediately if she tests positive for pregnancy fast enough and she miscarries. She is asking since she has had 4 successful pregnancies in the past if she can use the Progesterone otc cream daily. She said that [facility] sells \"Natural Progesterone\" cream and she would be interested in trying it to strengthen her uterus. She has googled and read about success stories and is frustrated from all of the miscarriages. Please advise.

## 2019-05-01 NOTE — TELEPHONE ENCOUNTER
That won't do anything. We can give her rx to start when she has positive UPT-- either oral or vaginal progesterone.

## 2019-05-01 NOTE — TELEPHONE ENCOUNTER
Patient will call back to give me exact address [facility]   [address] to send the ORAL progesterone too.

## 2019-05-02 NOTE — TELEPHONE ENCOUNTER
Patient called back and asked for the Progesterone. She is now asking for it to be dispensed vaginally. Patient mentioned that her period was only a couple of days long this month and is already considering if she is pregnant. I advised that this may be the case, but periods fluctuate length for some people for various reasons. Advised if concerned of pregnancy, see her PCP in the town she lives to get Beta's tested especially if urine test is positive. She may not have given this enough time to determine pregnancy.       [facility] [address]

## 2019-05-02 NOTE — TELEPHONE ENCOUNTER
05/02/19- as of now I have not heard back from patient. I explained to her without proper address of pharmacy I can not look it up, especially not being local.  Will wait to hear from her.

## 2019-05-02 NOTE — TELEPHONE ENCOUNTER
The vaginal gel is crinone 8%. It is outrageously expensive so she wants to make sure her insurance covers it.

## 2019-05-03 RX ORDER — PROGESTERONE 200 MG/1
200 CAPSULE ORAL DAILY
Qty: 60 CAP | Refills: 0 | Status: SHIPPED | OUTPATIENT
Start: 2019-05-03 | End: 2019-07-02

## 2019-05-03 NOTE — TELEPHONE ENCOUNTER
Can I get name and directions for the oral tabs for her to have sent to her pharmacy since the vaginal is too expensive?

## 2019-05-20 ENCOUNTER — TELEPHONE (OUTPATIENT)
Dept: OBGYN CLINIC | Age: 29
End: 2019-05-20

## 2019-05-20 NOTE — TELEPHONE ENCOUNTER
Patient calling stating that she is having signs of being pregnant. She states her LMP was 4/28/19 and states that she has increased urination, breast tenderness, intense cravings, fatigue and some dizziness. Patient wanted to know when to check a UPT. Patient advised that based on LMP her gestation is 3w1d pregnant, and she should test for pregnancy this up coming Friday and use the first morning urine to have an accurate UPT. She does have the progesterone rx and will start taking it once she has gotten the +UPT. Patient also asked how the oral progesterone works. Patient explained in detail.

## 2021-08-25 ENCOUNTER — OFFICE VISIT (OUTPATIENT)
Dept: FAMILY MEDICINE CLINIC | Age: 31
End: 2021-08-25

## 2021-08-25 VITALS
HEART RATE: 78 BPM | BODY MASS INDEX: 27.55 KG/M2 | WEIGHT: 155.5 LBS | TEMPERATURE: 98.4 F | HEIGHT: 63 IN | OXYGEN SATURATION: 97 % | RESPIRATION RATE: 16 BRPM | SYSTOLIC BLOOD PRESSURE: 142 MMHG | DIASTOLIC BLOOD PRESSURE: 89 MMHG

## 2021-08-25 DIAGNOSIS — F32.A DEPRESSION, UNSPECIFIED DEPRESSION TYPE: ICD-10-CM

## 2021-08-25 DIAGNOSIS — M51.36 DDD (DEGENERATIVE DISC DISEASE), LUMBAR: Primary | ICD-10-CM

## 2021-08-25 DIAGNOSIS — F41.0 PANIC ANXIETY SYNDROME: ICD-10-CM

## 2021-08-25 PROCEDURE — 99214 OFFICE O/P EST MOD 30 MIN: CPT | Performed by: FAMILY MEDICINE

## 2021-08-25 RX ORDER — DULOXETIN HYDROCHLORIDE 60 MG/1
60 CAPSULE, DELAYED RELEASE ORAL DAILY
COMMUNITY
End: 2021-08-25

## 2021-08-25 RX ORDER — MELOXICAM 15 MG/1
15 TABLET ORAL DAILY
COMMUNITY

## 2021-08-25 RX ORDER — BACLOFEN 10 MG/1
10 TABLET ORAL 3 TIMES DAILY
COMMUNITY
End: 2021-08-25

## 2021-08-25 RX ORDER — VENLAFAXINE HYDROCHLORIDE 75 MG/1
75 CAPSULE, EXTENDED RELEASE ORAL DAILY
Qty: 30 CAPSULE | Refills: 1 | Status: SHIPPED | OUTPATIENT
Start: 2021-08-25 | End: 2021-09-16

## 2021-08-25 RX ORDER — ZOLMITRIPTAN 2.5 MG/1
2.5 TABLET, FILM COATED ORAL AS NEEDED
COMMUNITY

## 2021-08-25 RX ORDER — GABAPENTIN 400 MG/1
400 CAPSULE ORAL 3 TIMES DAILY
COMMUNITY
End: 2021-08-25

## 2021-08-25 NOTE — PROGRESS NOTES
Chief Complaint   Patient presents with    Back Pain    Arthritis    Medication Refill     RXs added per patient: Dr Sawyer He : Adina Daigleberenice [place]    Sleep Problem     1. Have you been to the ER, urgent care clinic since your last visit? Hospitalized since your last visit? No    2. Have you seen or consulted any other health care providers outside of the [address] [place] [address] since your last visit? Include any pap smears or colon screening. No             Chief Complaint   Patient presents with    Back Pain    Arthritis    Medication Refill     RXs added per patient: Dr Sawyer He : Adinarafa Edwards CT    Sleep Problem     She is a 35 y.o. female who presents for evalution. Reviewed PmHx, RxHx, FmHx, SocHx, AllgHx and updated and dated in the chart. Patient Active Problem List    Diagnosis    DDD (degenerative disc disease), lumbar    Smoker    Upper abdominal pain    Migraine headache    ADD (attention deficit disorder)    Panic anxiety syndrome    Depression    Elevated LFTs    Seasonal allergic reaction    Hypercholesteremia       Review of Systems - negative except as listed above in the HPI    Objective:     Vitals:    08/25/21 1457   BP: (!) 142/89   Pulse: 78   Resp: 16   Temp: 98.4 °F (36.9 °C)   TempSrc: Oral   SpO2: 97%   Weight: 155 lb 8 oz (70.5 kg)   Height: 5' 3\" (1.6 m)         Assessment/ Plan:   Diagnoses and all orders for this visit:    1. DDD (degenerative disc disease), lumbar  -     REFERRAL TO ORTHOPEDICS  Patient was diagnosed with degenerative disc disease while she was up in [place]. Refer patient to orthopedics and asked her to get MRI records. We will discontinue the gabapentin since she is taking just once a day and not having benefit as well discontinue the baclofen. Continue with meloxicam.  2. Depression, unspecified depression type  -     venlafaxine-SR (EFFEXOR-XR) 75 mg capsule; Take 1 Capsule by mouth daily.  Indications: anxiousness associated with depression  Patient is not getting benefit from Cymbalta. Will DC that medication and replace with Effexor secondary to her increasing irritability. Recheck in 1 month. 3. Panic anxiety syndrome  -     venlafaxine-SR (EFFEXOR-XR) 75 mg capsule; Take 1 Capsule by mouth daily. Indications: anxiousness associated with depression         Follow-up and Dispositions    · Return in about 1 month (around 9/25/2021). I have discussed the diagnosis with the patient and the intended plan as seen in the above orders. The patient understands and agrees with the plan. The patient has received an after-visit summary and questions were answered concerning future plans. Medication Side Effects and Warnings were discussed with patient  Patient Labs were reviewed and or requested:  Patient Past Records were reviewed and or requested    Rip Huddleston M.D. There are no Patient Instructions on file for this visit.

## 2021-08-30 ENCOUNTER — TELEPHONE (OUTPATIENT)
Dept: FAMILY MEDICINE CLINIC | Age: 31
End: 2021-08-30

## 2021-08-30 RX ORDER — METHOCARBAMOL 750 MG/1
750 TABLET, FILM COATED ORAL 4 TIMES DAILY
Qty: 40 TABLET | Refills: 0 | Status: SHIPPED | OUTPATIENT
Start: 2021-08-30 | End: 2021-09-09

## 2021-08-30 NOTE — TELEPHONE ENCOUNTER
Patient is requesting pain medication for back pain. Pharmacy on file.  Patient's phone: [phone]

## 2021-09-09 ENCOUNTER — TELEPHONE (OUTPATIENT)
Dept: FAMILY MEDICINE CLINIC | Age: 31
End: 2021-09-09

## 2021-09-09 RX ORDER — PREDNISONE 10 MG/1
TABLET ORAL
Qty: 1 DOSE PACK | Refills: 0 | Status: SHIPPED | OUTPATIENT
Start: 2021-09-09

## 2021-09-09 NOTE — TELEPHONE ENCOUNTER
Patient stating Methocarbamol is not helping her back pain and is requesting a stronger pain medication as her back pain is a 9 of a 10. Pharmacy on file.  Patient's phone: [phone]

## 2021-09-09 NOTE — TELEPHONE ENCOUNTER
Clif Smith called in. Patient needs to see orthopedics as discussed with her at her last office visit.     Shikha Rosa

## 2021-09-15 NOTE — TELEPHONE ENCOUNTER
Alyssa Rutledge from [facility] called and said the last number on the diagnosis code for the specimen done on 17 is missing. Please call back to give them the missing number at [phone].  She states you can speak with anyone and to use the patients name and 
Called and spoke to a [facility] rep and gave her the updated icd code of r87.810
<-- Click to Launch ICDx for Problem Selector

## 2021-09-16 ENCOUNTER — VIRTUAL VISIT (OUTPATIENT)
Dept: FAMILY MEDICINE CLINIC | Age: 31
End: 2021-09-16
Payer: MEDICAID

## 2021-09-16 DIAGNOSIS — F41.0 PANIC ANXIETY SYNDROME: ICD-10-CM

## 2021-09-16 DIAGNOSIS — F32.A DEPRESSION, UNSPECIFIED DEPRESSION TYPE: Primary | ICD-10-CM

## 2021-09-16 DIAGNOSIS — F51.01 PRIMARY INSOMNIA: ICD-10-CM

## 2021-09-16 PROCEDURE — 99214 OFFICE O/P EST MOD 30 MIN: CPT | Performed by: FAMILY MEDICINE

## 2021-09-16 RX ORDER — HYDROXYZINE 25 MG/1
25 TABLET, FILM COATED ORAL
Qty: 60 TABLET | Refills: 1 | Status: SHIPPED | OUTPATIENT
Start: 2021-09-16 | End: 2021-10-13 | Stop reason: SDUPTHER

## 2021-09-16 RX ORDER — FLUOXETINE HYDROCHLORIDE 20 MG/1
20 CAPSULE ORAL DAILY
Qty: 30 CAPSULE | Refills: 1 | Status: SHIPPED | OUTPATIENT
Start: 2021-09-16 | End: 2021-11-10
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
## 2021-09-23 ENCOUNTER — VIRTUAL VISIT (OUTPATIENT)
Dept: FAMILY MEDICINE CLINIC | Age: 31
End: 2021-09-23
Payer: MEDICAID

## 2021-09-23 DIAGNOSIS — F32.A DEPRESSION, UNSPECIFIED DEPRESSION TYPE: Primary | ICD-10-CM

## 2021-09-23 DIAGNOSIS — F41.0 PANIC ANXIETY SYNDROME: ICD-10-CM

## 2021-09-23 PROCEDURE — 99213 OFFICE O/P EST LOW 20 MIN: CPT | Performed by: FAMILY MEDICINE

## 2021-09-23 NOTE — PROGRESS NOTES
Consent: Lazarus Izaguirre, who was seen by synchronous (real-time) audio-video technology, and/or her healthcare decision maker, is aware that this patient-initiated, Telehealth encounter on 9/23/2021 is a billable service, with coverage as determined by her insurance carrier. She is aware that she may receive a bill and has provided verbal consent to proceed: YES-Consent obtained within past 12 months  712    Prior to Admission medications    Medication Sig Start Date End Date Taking? Authorizing Provider   FLUoxetine (PROzac) 20 mg capsule Take 1 Capsule by mouth daily. 9/16/21   Maggy Jordan MD   hydrOXYzine HCL (ATARAX) 25 mg tablet Take 1 Tablet by mouth three (3) times daily as needed for Anxiety for up to 30 days. Indications: anxious, sleep 9/16/21 10/16/21  Maggy Jordan MD   predniSONE [facility] DS) 10 mg dose pack 12 day DS taper pack as directed 9/9/21   Maggy Jordan MD   meloxicam [facility]) 15 mg tablet Take 15 mg by mouth daily. Provider, Historical   ZOLMitriptan (Zomig) 2.5 mg tablet Take 2.5 mg by mouth as needed for Migraine. Provider, Historical   atorvastatin (LIPITOR) 40 mg tablet Take 1 Tab by mouth daily. Patient not taking: Reported on 8/25/2021 3/13/19   Maggy Jordan MD   ibuprofen (MOTRIN) 600 mg tablet Take 1 Tab by mouth every eight (8) hours as needed for Pain. Patient not taking: Reported on 8/25/2021 3/13/19   Maggy Jordan MD   diphenhydrAMINE (BENADRYL) 25 mg tablet Take 1 Tab by mouth nightly as needed for Sleep. Patient not taking: Reported on 8/25/2021 9/25/18   Leydi Moses MD   ondansetron [facility] ODT) 8 mg disintegrating tablet Take 1 Tab by mouth every eight (8) hours as needed for Nausea. Patient not taking: Reported on 8/25/2021 9/14/18   Leydi Moses MD     Allergies   Allergen Reactions   Olden Erick [Benzonatate] Rash           Assessment & Plan:   Diagnoses and all orders for this visit:    1.  Depression, unspecified depression type  Overall patient is doing better with medication changes no change at this time. 2. Panic anxiety syndrome  Continue with current plan      Medication Side Effects and Warnings were discussed with patient  Patient Labs were reviewed and or requested:  Patient Past Records were reviewed and or requested              We discussed the expected course, resolution and complications of the diagnosis(es) in detail. Medication risks, benefits, costs, interactions, and alternatives were discussed as indicated. I advised her to contact the office if her condition worsens, changes or fails to improve as anticipated. She expressed understanding with the diagnosis(es) and plan. Jagjit Gallo is a 35 y.o. female being evaluated by a video visit encounter for concerns as above. A caregiver was present when appropriate. Due to this being a TeleHealth encounter (During IYUXM-39 public health emergency), evaluation of the following organ systems was limited: Vitals/Constitutional/EENT/Resp/CV/GI//MS/Neuro/Skin/Heme-Lymph-Imm. Pursuant to the emergency declaration under the [facility] waiver authority and the [employer] Act, this Virtual  Visit was conducted, with patient's (and/or legal guardian's) consent, to reduce the patient's risk of exposure to COVID-19 and provide necessary medical care. Services were provided through a video synchronous discussion virtually to substitute for in-person clinic visit. Patient and provider were located at their individual homes. I have discussed the diagnosis with the patient and the intended plan as seen in the above orders. The patient understands and agrees with the plan. The patient has received an after-visit summary and questions were answered concerning future plans.      Medication Side Effects and Warnings were discussed with patient  Patient Labs were reviewed and or requested:  Patient Past Records were reviewed and or requested    Alise Barr M.D. There are no Patient Instructions on file for this visit.

## 2021-10-13 ENCOUNTER — TELEPHONE (OUTPATIENT)
Dept: FAMILY MEDICINE CLINIC | Age: 31
End: 2021-10-13

## 2021-10-13 RX ORDER — HYDROXYZINE 25 MG/1
25 TABLET, FILM COATED ORAL
Qty: 60 TABLET | Refills: 1 | Status: SHIPPED | OUTPATIENT
Start: 2021-10-13 | End: 2022-03-14 | Stop reason: SDUPTHER

## 2021-10-13 NOTE — TELEPHONE ENCOUNTER
----- Message from Jack Pritchett sent at 10/13/2021 10:19 AM EDT -----  Subject: Message to Provider    QUESTIONS  Information for Provider? Pt is wanting to be prescribed birth control   medication  ---------------------------------------------------------------------------  --------------  CALL BACK INFO  What is the best way for the office to contact you? OK to leave message on   voicemail  Preferred Call Back Phone Number? [phone]  ---------------------------------------------------------------------------  --------------  SCRIPT ANSWERS  Relationship to Patient?  Self

## 2021-11-10 DIAGNOSIS — F32.A DEPRESSION, UNSPECIFIED DEPRESSION TYPE: ICD-10-CM

## 2021-11-10 RX ORDER — FLUOXETINE HYDROCHLORIDE 20 MG/1
CAPSULE ORAL
Qty: 30 CAPSULE | Refills: 1 | Status: SHIPPED | OUTPATIENT
Start: 2021-11-10 | End: 2021-11-24 | Stop reason: SDUPTHER

## 2021-11-22 NOTE — TELEPHONE ENCOUNTER
Patient requesting Albuterol Nebulizer and Albuterol for Nebulizer. Pharmacy on file.  Patient's phone: [phone]

## 2021-11-24 DIAGNOSIS — F32.A DEPRESSION, UNSPECIFIED DEPRESSION TYPE: ICD-10-CM

## 2021-11-24 RX ORDER — FLUOXETINE HYDROCHLORIDE 20 MG/1
20 CAPSULE ORAL DAILY
Qty: 30 CAPSULE | Refills: 1 | Status: SHIPPED | OUTPATIENT
Start: 2021-11-24 | End: 2022-04-14

## 2022-01-27 ENCOUNTER — VIRTUAL VISIT (OUTPATIENT)
Dept: FAMILY MEDICINE CLINIC | Age: 32
End: 2022-01-27
Payer: MEDICAID

## 2022-01-27 DIAGNOSIS — F51.01 PRIMARY INSOMNIA: Primary | ICD-10-CM

## 2022-01-27 PROCEDURE — 99213 OFFICE O/P EST LOW 20 MIN: CPT | Performed by: FAMILY MEDICINE

## 2022-01-27 RX ORDER — TRAZODONE HYDROCHLORIDE 100 MG/1
100 TABLET ORAL
Qty: 30 TABLET | Refills: 3 | Status: SHIPPED | OUTPATIENT
Start: 2022-01-27 | End: 2022-03-14

## 2022-03-14 RX ORDER — HYDROXYZINE 50 MG/1
50 TABLET, FILM COATED ORAL
Qty: 30 TABLET | Refills: 5 | Status: SHIPPED | OUTPATIENT
Start: 2022-03-14 | End: 2022-10-17

## 2022-04-14 DIAGNOSIS — F32.A DEPRESSION, UNSPECIFIED DEPRESSION TYPE: ICD-10-CM

## 2022-04-14 RX ORDER — FLUOXETINE HYDROCHLORIDE 20 MG/1
CAPSULE ORAL
Qty: 30 CAPSULE | Refills: 1 | Status: SHIPPED | OUTPATIENT
Start: 2022-04-14 | End: 2022-06-14

## 2022-06-12 DIAGNOSIS — F32.A DEPRESSION, UNSPECIFIED DEPRESSION TYPE: ICD-10-CM

## 2022-06-14 RX ORDER — FLUOXETINE HYDROCHLORIDE 20 MG/1
CAPSULE ORAL
Qty: 30 CAPSULE | Refills: 1 | Status: SHIPPED | OUTPATIENT
Start: 2022-06-14

## 2022-09-19 ENCOUNTER — DOCUMENTATION ONLY (OUTPATIENT)
Dept: FAMILY MEDICINE CLINIC | Age: 32
End: 2022-09-19

## 2022-10-17 RX ORDER — HYDROXYZINE 50 MG/1
TABLET, FILM COATED ORAL
Qty: 30 TABLET | Refills: 5 | Status: SHIPPED | OUTPATIENT
Start: 2022-10-17

## 2025-03-18 NOTE — MR AVS SNAPSHOT
Visit Information Date & Time Provider Department Dept. Phone Encounter #  
 3/15/2017  9:45 AM Francisco Nguyen MD [address] [facility] [phone] [id number] Follow-up Instructions Return in about 6 months (around 9/15/2017). Your Appointments 3/16/2017 10:40 AM  
ANNUAL with Ernesto Calvillo MD  
[address] [facility]) Appt Note: Annual; Annual  
 [address]  
[phone]  
  
   
 [address] [place] [address] Avenue. [facility] [address] [address] Upcoming Health Maintenance Date Due  
 HPV AGE 9Y-34Y (1 of 3 - Female 3 Dose Series) 10/8/2002 Pneumococcal 19-64 Medium Risk (1 of 1 - PPSV23) 10/8/2010 DTaP/Tdap/Td series (1 - Tdap) 10/8/2012 PAP AKA CERVICAL CYTOLOGY 9/8/2017 Allergies as of 3/15/2017  Review Complete On: 3/15/2017 By: Francisco Nguyen MD  
  
 Severity Noted Reaction Type Reactions Tessalon [Benzonatate]  02/28/2017    Rash Current Immunizations  Reviewed on 8/17/2015 No immunizations on file. Not reviewed this visit You Were Diagnosed With   
  
 Codes Comments Hypercholesteremia    -  Primary ICD-10-CM: E78.00 ICD-9-CM: 272.0 Vertigo     ICD-10-CM: Q95 ICD-9-CM: 780.4 Vitals BP Pulse Temp Resp Height(growth percentile) Weight(growth percentile) 131/81 73 97.9 °F (36.6 °C) 20 5' 3\" (1.6 m) 160 lb (72.6 kg) SpO2 BMI OB Status Smoking Status 98% 28.34 kg/m2 Having regular periods Current Every Day Smoker Vitals History BMI and BSA Data Body Mass Index Body Surface Area  
 28.34 kg/m 2 1.8 m 2 Preferred Pharmacy Pharmacy Name Phone [id number] [address] [facility] [phone] Your Updated Medication List  
  
   
This list is accurate as of: 3/15/17 10:45 AM.  Always use your most recent med list.  
  
  
  
  
 * cetirizine 10 mg tablet Zofran as prescribed for nausea  Bentyl as prescribed for abdominal pain, cramping  Increase fluid intake and rest  Increase intake of water or electrolyte containing rehydration solutions and small frequent sips  Advance diet leave beginning tomorrow, start with bland easy to digest foods  Avoid spicy or greasy foods  May use over-the-counter Tylenol or ibuprofen according to package directions for fever pain  Seek urgent care for new or worsening symptoms to include increasing pain, unstoppable vomiting, dehydration, blood or fecal matter in vomit, blood in stool or other concerning symptoms    Commonly known as:  ZYRTEC Take 1 Tab by mouth daily. * cetirizine 10 mg tablet Commonly known as:  ZYRTEC Take 1 Tab by mouth daily. FLUoxetine 20 mg capsule Commonly known as:  PROzac  
20 mg daily. LORazepam 0.5 mg tablet Commonly known as:  ATIVAN Take 1 Tab by mouth every eight (8) hours as needed for Anxiety. Max Daily Amount: 1.5 mg. Use sparingly and for extreme anxiety. Must last 30 days. mometasone 50 mcg/actuation nasal spray Commonly known as:  NASONEX  
2 Sprays by Both Nostrils route daily. norgestimate-ethinyl estradiol 0.25-35 mg-mcg Tab Commonly known as:  Carlos Mayking Take 1 Tab by mouth daily. Indications: DYSMENORRHEA  
  
 propranolol 10 mg tablet Commonly known as:  INDERAL Take 1 Tab by mouth three (3) times daily as needed. simvastatin 40 mg tablet Commonly known as:  ZOCOR Take 1 Tab by mouth nightly. SUMAtriptan 100 mg tablet Commonly known as:  IMITREX Take 1 Tab by mouth once as needed for Migraine for up to 1 dose. Repeat 2 hours if not gone, no more than 2 pills in 24 hours * Notice: This list has 2 medication(s) that are the same as other medications prescribed for you. Read the directions carefully, and ask your doctor or other care provider to review them with you. Prescriptions Sent to Pharmacy Refills  
 cetirizine (ZYRTEC) 10 mg tablet 5 Sig: Take 1 Tab by mouth daily. Class: Normal  
 Pharmacy: [facility] Ph #: [phone] Route: Oral  
  
We Performed the Following LIPID PANEL [51371 CPT(R)] METABOLIC PANEL, COMPREHENSIVE [40163 CPT(R)] Follow-up Instructions Return in about 6 months (around 9/15/2017). Introducing [facility]! Dear Shagufta Wade: 
Thank you for requesting a Spread[employer] account. Our records indicate that you already have an active Spreadknowledge account.   You can access your account anytime at [email or web address] Did you know that you can access your hospital and ER discharge instructions at any time in [employer]? You can also review all of your test results from your hospital stay or ER visit. Additional Information If you have questions, please visit the Frequently Asked Questions section of the [employer] website at [email or web address] Remember, [employer] is NOT to be used for urgent needs. For medical emergencies, dial 911. Now available from your iPhone and Android! Please provide this summary of care documentation to your next provider. Your primary care clinician is listed as WENCESLAO LAWSON. If you have any questions after today's visit, please call [phone].

## (undated) DEVICE — GOWN,SIRUS,NONRNF,SETINSLV,2XL,18/CS: Brand: MEDLINE

## (undated) DEVICE — PREP PAD BNS: Brand: CONVERTORS

## (undated) DEVICE — CATH URETH INTMIT ROB 16FR FUN -- CONVERT TO ITEM 179520

## (undated) DEVICE — HANDLE SUCT TBNG L6FR DIA3/8IN SWVL W/ M ADPT FOR BERK PMP

## (undated) DEVICE — SKIN MARKER,REGULAR TIP WITH RULER AND LABELS: Brand: DEVON

## (undated) DEVICE — CURETTE VAC DIA9MM PLAS CRV OPN TIP RIG DISP VACURET D/E

## (undated) DEVICE — LIGHT HANDLE: Brand: DEVON

## (undated) DEVICE — TRAY PREP DRY W/ PREM GLV 2 APPL 6 SPNG 2 UNDPD 1 OVERWRAP

## (undated) DEVICE — PAD SANIT NPKN 4IN GRD

## (undated) DEVICE — INFECTION CONTROL KIT SYS

## (undated) DEVICE — STERILE POLYISOPRENE POWDER-FREE SURGICAL GLOVES: Brand: PROTEXIS

## (undated) DEVICE — DEVON™ KNEE AND BODY STRAP 60" X 3" (1.5 M X 7.6 CM): Brand: DEVON

## (undated) DEVICE — STRAP RESTRAIN W3.5XL19IN TECLIN STRRP POS LEG DURING LITH

## (undated) DEVICE — PERI/GYN PACK: Brand: CONVERTORS

## (undated) DEVICE — TELFA NON-ADHERENT ABSORBENT DRESSING: Brand: TELFA

## (undated) DEVICE — GRADUATED BOWL: Brand: DEVON

## (undated) DEVICE — X-RAY SPONGES,16 PLY: Brand: DERMACEA

## (undated) DEVICE — SOLUTION IV 1000ML 0.9% SOD CHL

## (undated) DEVICE — COLLECTION KT SUC TISS BERK -- GYRUS

## (undated) DEVICE — TOWEL SURG W17XL27IN STD BLU COT NONFENESTRATED PREWASHED